# Patient Record
Sex: MALE | Race: ASIAN | Employment: OTHER | URBAN - METROPOLITAN AREA
[De-identification: names, ages, dates, MRNs, and addresses within clinical notes are randomized per-mention and may not be internally consistent; named-entity substitution may affect disease eponyms.]

---

## 2018-02-24 ENCOUNTER — APPOINTMENT (OUTPATIENT)
Dept: ULTRASOUND IMAGING | Facility: HOSPITAL | Age: 78
DRG: 853 | End: 2018-02-24
Attending: EMERGENCY MEDICINE
Payer: MEDICARE

## 2018-02-24 ENCOUNTER — HOSPITAL ENCOUNTER (INPATIENT)
Facility: HOSPITAL | Age: 78
LOS: 7 days | Discharge: NURSING, REHAB OR LONG-TERM CARE | DRG: 853 | End: 2018-03-04
Attending: EMERGENCY MEDICINE | Admitting: STUDENT IN AN ORGANIZED HEALTH CARE EDUCATION/TRAINING PROGRAM
Payer: MEDICARE

## 2018-02-24 ENCOUNTER — APPOINTMENT (OUTPATIENT)
Dept: CT IMAGING | Facility: HOSPITAL | Age: 78
DRG: 853 | End: 2018-02-24
Attending: EMERGENCY MEDICINE
Payer: MEDICARE

## 2018-02-24 ENCOUNTER — APPOINTMENT (OUTPATIENT)
Dept: GENERAL RADIOLOGY | Facility: HOSPITAL | Age: 78
DRG: 853 | End: 2018-02-24
Attending: EMERGENCY MEDICINE
Payer: MEDICARE

## 2018-02-24 DIAGNOSIS — K85.90 ACUTE PANCREATITIS WITHOUT INFECTION OR NECROSIS, UNSPECIFIED PANCREATITIS TYPE: Primary | ICD-10-CM

## 2018-02-24 DIAGNOSIS — N17.9 ACUTE RENAL INJURY (HCC): ICD-10-CM

## 2018-02-24 LAB
ALBUMIN SERPL-MCNC: 3 G/DL (ref 3.5–4.8)
ALP LIVER SERPL-CCNC: 312 U/L (ref 45–117)
ALT SERPL-CCNC: 293 U/L (ref 17–63)
AST SERPL-CCNC: 168 U/L (ref 15–41)
BASOPHILS # BLD AUTO: 0.01 X10(3) UL (ref 0–0.1)
BASOPHILS NFR BLD AUTO: 0.1 %
BILIRUB SERPL-MCNC: 7.3 MG/DL (ref 0.1–2)
BUN BLD-MCNC: 31 MG/DL (ref 8–20)
CALCIUM BLD-MCNC: 8.9 MG/DL (ref 8.3–10.3)
CHLORIDE: 106 MMOL/L (ref 101–111)
CO2: 25 MMOL/L (ref 22–32)
CREAT BLD-MCNC: 1.98 MG/DL (ref 0.7–1.3)
EOSINOPHIL # BLD AUTO: 0 X10(3) UL (ref 0–0.3)
EOSINOPHIL NFR BLD AUTO: 0 %
ERYTHROCYTE [DISTWIDTH] IN BLOOD BY AUTOMATED COUNT: 14.7 % (ref 11.5–16)
GLUCOSE BLD-MCNC: 147 MG/DL (ref 70–99)
HCT VFR BLD AUTO: 39.6 % (ref 37–53)
HGB BLD-MCNC: 13 G/DL (ref 13–17)
IMMATURE GRANULOCYTE COUNT: 0.03 X10(3) UL (ref 0–1)
IMMATURE GRANULOCYTE RATIO %: 0.4 %
LACTIC ACID: 1.8 MMOL/L (ref 0.5–2)
LIPASE: >7500 U/L (ref 73–393)
LYMPHOCYTES # BLD AUTO: 0.08 X10(3) UL (ref 0.9–4)
LYMPHOCYTES NFR BLD AUTO: 1.1 %
M PROTEIN MFR SERPL ELPH: 7.3 G/DL (ref 6.1–8.3)
MCH RBC QN AUTO: 30.2 PG (ref 27–33.2)
MCHC RBC AUTO-ENTMCNC: 32.8 G/DL (ref 31–37)
MCV RBC AUTO: 92.1 FL (ref 80–99)
MONOCYTES # BLD AUTO: 0.55 X10(3) UL (ref 0.1–1)
MONOCYTES NFR BLD AUTO: 7.2 %
NEUTROPHIL ABS PRELIM: 6.94 X10 (3) UL (ref 1.3–6.7)
NEUTROPHILS # BLD AUTO: 6.94 X10(3) UL (ref 1.3–6.7)
NEUTROPHILS NFR BLD AUTO: 91.2 %
PLATELET # BLD AUTO: 115 10(3)UL (ref 150–450)
POTASSIUM SERPL-SCNC: 3.4 MMOL/L (ref 3.6–5.1)
RBC # BLD AUTO: 4.3 X10(6)UL (ref 3.8–5.8)
RED CELL DISTRIBUTION WIDTH-SD: 49.1 FL (ref 35.1–46.3)
SODIUM SERPL-SCNC: 142 MMOL/L (ref 136–144)
WBC # BLD AUTO: 7.6 X10(3) UL (ref 4–13)

## 2018-02-24 PROCEDURE — 76700 US EXAM ABDOM COMPLETE: CPT | Performed by: EMERGENCY MEDICINE

## 2018-02-24 PROCEDURE — 71045 X-RAY EXAM CHEST 1 VIEW: CPT | Performed by: EMERGENCY MEDICINE

## 2018-02-24 PROCEDURE — 74176 CT ABD & PELVIS W/O CONTRAST: CPT | Performed by: EMERGENCY MEDICINE

## 2018-02-24 PROCEDURE — 99223 1ST HOSP IP/OBS HIGH 75: CPT | Performed by: STUDENT IN AN ORGANIZED HEALTH CARE EDUCATION/TRAINING PROGRAM

## 2018-02-24 RX ORDER — POTASSIUM CHLORIDE 20 MEQ/1
20 TABLET, EXTENDED RELEASE ORAL DAILY
Status: ON HOLD | COMMUNITY
End: 2020-01-01

## 2018-02-24 RX ORDER — BICALUTAMIDE 50 MG/1
50 TABLET, FILM COATED ORAL DAILY
Status: ON HOLD | COMMUNITY
End: 2020-01-01

## 2018-02-24 RX ORDER — L.ACID,PARA/B.BIFIDUM/S.THERM 8B CELL
1 CAPSULE ORAL DAILY
COMMUNITY

## 2018-02-24 RX ORDER — ONDANSETRON 2 MG/ML
4 INJECTION INTRAMUSCULAR; INTRAVENOUS ONCE
Status: DISCONTINUED | OUTPATIENT
Start: 2018-02-24 | End: 2018-02-24

## 2018-02-24 RX ORDER — WATER / MINERAL OIL / WHITE PETROLATUM 16 OZ
CREAM TOPICAL AS NEEDED
Status: ON HOLD | COMMUNITY
End: 2020-01-01

## 2018-02-24 RX ORDER — PROCHLORPERAZINE 25 MG
25 SUPPOSITORY, RECTAL RECTAL EVERY 12 HOURS PRN
COMMUNITY

## 2018-02-24 RX ORDER — ONDANSETRON 2 MG/ML
4 INJECTION INTRAMUSCULAR; INTRAVENOUS ONCE
Status: DISCONTINUED | OUTPATIENT
Start: 2018-02-24 | End: 2018-03-04

## 2018-02-25 ENCOUNTER — APPOINTMENT (OUTPATIENT)
Dept: GENERAL RADIOLOGY | Facility: HOSPITAL | Age: 78
DRG: 853 | End: 2018-02-25
Attending: STUDENT IN AN ORGANIZED HEALTH CARE EDUCATION/TRAINING PROGRAM
Payer: MEDICARE

## 2018-02-25 PROBLEM — K85.90 ACUTE PANCREATITIS WITHOUT INFECTION OR NECROSIS, UNSPECIFIED PANCREATITIS TYPE: Status: ACTIVE | Noted: 2018-02-25

## 2018-02-25 PROBLEM — N17.9 ACUTE RENAL INJURY (HCC): Status: ACTIVE | Noted: 2018-02-25

## 2018-02-25 LAB
ALBUMIN SERPL-MCNC: 2.4 G/DL (ref 3.5–4.8)
ALP LIVER SERPL-CCNC: 237 U/L (ref 45–117)
ALT SERPL-CCNC: 203 U/L (ref 17–63)
AST SERPL-CCNC: 98 U/L (ref 15–41)
ATRIAL RATE: 114 BPM
BASOPHILS # BLD AUTO: 0.01 X10(3) UL (ref 0–0.1)
BASOPHILS NFR BLD AUTO: 0.1 %
BILIRUB SERPL-MCNC: 6.3 MG/DL (ref 0.1–2)
BUN BLD-MCNC: 31 MG/DL (ref 8–20)
CALCIUM BLD-MCNC: 8.1 MG/DL (ref 8.3–10.3)
CHLORIDE: 112 MMOL/L (ref 101–111)
CO2: 23 MMOL/L (ref 22–32)
CREAT BLD-MCNC: 1.7 MG/DL (ref 0.7–1.3)
EOSINOPHIL # BLD AUTO: 0 X10(3) UL (ref 0–0.3)
EOSINOPHIL NFR BLD AUTO: 0 %
ERYTHROCYTE [DISTWIDTH] IN BLOOD BY AUTOMATED COUNT: 14.9 % (ref 11.5–16)
GLUCOSE BLD-MCNC: 121 MG/DL (ref 70–99)
HCT VFR BLD AUTO: 33.7 % (ref 37–53)
HGB BLD-MCNC: 11.1 G/DL (ref 13–17)
IMMATURE GRANULOCYTE COUNT: 0.02 X10(3) UL (ref 0–1)
IMMATURE GRANULOCYTE RATIO %: 0.3 %
LYMPHOCYTES # BLD AUTO: 0.26 X10(3) UL (ref 0.9–4)
LYMPHOCYTES NFR BLD AUTO: 3.8 %
M PROTEIN MFR SERPL ELPH: 5.8 G/DL (ref 6.1–8.3)
MCH RBC QN AUTO: 30.2 PG (ref 27–33.2)
MCHC RBC AUTO-ENTMCNC: 32.9 G/DL (ref 31–37)
MCV RBC AUTO: 91.8 FL (ref 80–99)
MONOCYTES # BLD AUTO: 0.8 X10(3) UL (ref 0.1–1)
MONOCYTES NFR BLD AUTO: 11.7 %
NEUTROPHIL ABS PRELIM: 5.76 X10 (3) UL (ref 1.3–6.7)
NEUTROPHILS # BLD AUTO: 5.76 X10(3) UL (ref 1.3–6.7)
NEUTROPHILS NFR BLD AUTO: 84.1 %
P AXIS: 31 DEGREES
P-R INTERVAL: 146 MS
PLATELET # BLD AUTO: 91 10(3)UL (ref 150–450)
POTASSIUM SERPL-SCNC: 3.3 MMOL/L (ref 3.6–5.1)
Q-T INTERVAL: 356 MS
QRS DURATION: 108 MS
QTC CALCULATION (BEZET): 490 MS
R AXIS: -48 DEGREES
RBC # BLD AUTO: 3.67 X10(6)UL (ref 3.8–5.8)
RED CELL DISTRIBUTION WIDTH-SD: 50.3 FL (ref 35.1–46.3)
SODIUM SERPL-SCNC: 145 MMOL/L (ref 136–144)
T AXIS: -2 DEGREES
VENTRICULAR RATE: 114 BPM
WBC # BLD AUTO: 6.9 X10(3) UL (ref 4–13)

## 2018-02-25 PROCEDURE — 71045 X-RAY EXAM CHEST 1 VIEW: CPT | Performed by: STUDENT IN AN ORGANIZED HEALTH CARE EDUCATION/TRAINING PROGRAM

## 2018-02-25 PROCEDURE — 99233 SBSQ HOSP IP/OBS HIGH 50: CPT | Performed by: HOSPITALIST

## 2018-02-25 RX ORDER — ONDANSETRON 2 MG/ML
4 INJECTION INTRAMUSCULAR; INTRAVENOUS EVERY 4 HOURS PRN
Status: DISCONTINUED | OUTPATIENT
Start: 2018-02-25 | End: 2018-02-25

## 2018-02-25 RX ORDER — DONEPEZIL HYDROCHLORIDE 10 MG/1
10 TABLET, FILM COATED ORAL NIGHTLY
Status: DISCONTINUED | OUTPATIENT
Start: 2018-02-25 | End: 2018-03-04

## 2018-02-25 RX ORDER — MORPHINE SULFATE 2 MG/ML
2 INJECTION, SOLUTION INTRAMUSCULAR; INTRAVENOUS EVERY 2 HOUR PRN
Status: DISCONTINUED | OUTPATIENT
Start: 2018-02-25 | End: 2018-02-27 | Stop reason: SDUPTHER

## 2018-02-25 RX ORDER — MORPHINE SULFATE 2 MG/ML
1 INJECTION, SOLUTION INTRAMUSCULAR; INTRAVENOUS EVERY 2 HOUR PRN
Status: DISCONTINUED | OUTPATIENT
Start: 2018-02-25 | End: 2018-02-27 | Stop reason: SDUPTHER

## 2018-02-25 RX ORDER — ONDANSETRON 2 MG/ML
4 INJECTION INTRAMUSCULAR; INTRAVENOUS EVERY 6 HOURS PRN
Status: DISCONTINUED | OUTPATIENT
Start: 2018-02-25 | End: 2018-03-04

## 2018-02-25 RX ORDER — HEPARIN SODIUM 5000 [USP'U]/ML
5000 INJECTION, SOLUTION INTRAVENOUS; SUBCUTANEOUS EVERY 12 HOURS SCHEDULED
Status: DISCONTINUED | OUTPATIENT
Start: 2018-02-25 | End: 2018-02-28

## 2018-02-25 RX ORDER — SODIUM CHLORIDE, SODIUM LACTATE, POTASSIUM CHLORIDE, CALCIUM CHLORIDE 600; 310; 30; 20 MG/100ML; MG/100ML; MG/100ML; MG/100ML
INJECTION, SOLUTION INTRAVENOUS CONTINUOUS
Status: DISCONTINUED | OUTPATIENT
Start: 2018-02-25 | End: 2018-02-27

## 2018-02-25 RX ORDER — SODIUM CHLORIDE 9 MG/ML
INJECTION, SOLUTION INTRAVENOUS CONTINUOUS
Status: ACTIVE | OUTPATIENT
Start: 2018-02-25 | End: 2018-02-25

## 2018-02-25 NOTE — PROGRESS NOTES
[facility] PSYCHIATRIC [place] HOSPITALIST  Progress Note     Cassandra Ferro Patient Status:  Inpatient    1/15/1940 MRN [id number]   [facility] 4NW-A Attending Esau Saeed MD   Hosp Day # 0 PCP [facility]     Chief Complaint: Nausea, vomiting    S: Patient nonverbal 4 mg Intravenous Once       ASSESSMENT / PLAN:     1. Acute pancreatitis, biliary in etiology given LFTs  1. Keep NPO, IVF  2. May need EGD/ERCP  3. GI following  2. Hypotension, resolved  3. LAURA, improving with IVF  4.  Possible aspiration PNA, on empiric

## 2018-02-25 NOTE — PLAN OF CARE
GASTROINTESTINAL - ADULT    • Minimal or absence of nausea and vomiting Progressing    • Maintains or returns to baseline bowel function Progressing        Pt. W/ no emesis. No abd pain or gaurding. No BM today. NPO.  Per GI may have sips of water/ clear li

## 2018-02-25 NOTE — PHYSICAL THERAPY NOTE
PHYSICAL THERAPY EVALUATION - INPATIENT     Room Number: 423/423-A  Evaluation Date: 2/25/2018  Type of Evaluation: Initial  Physician Order: PT Eval and Treat    Presenting Problem: nausea, vomiting, fatigue  Reason for Therapy: Mobility Dysfunction time and follows one step commands with repetition  · Initiation: cues to initiate tasks and hand over hand to initiate tasks  · Motor Planning: impaired  · Safety Judgement:  decreased awareness of need for assistance and decreased awareness of need for s definition    Skilled Therapy Provided: RN cleared pt for session but reported pt does not speak English and had been lethargic. PCT gave pt a bath and required total A to roll.  Upon arrival, pt received elevated supine in bed, opened eyes as soon as thera outcome measures completed include AM PAC with score 50.57%. Based on this evaluation, patient's clinical presentation is stable and overall the evaluation complexity is considered low.   These impairments and comorbidities manifest themselves as functiona

## 2018-02-25 NOTE — ED PROVIDER NOTES
Patient Seen in: [facility] Emergency Department    History   Patient presents with:  Nausea/Vomiting/Diarrhea (gastrointestinal)    Stated Complaint: vomiting    HPI    Patient is a 34-year-old nursing home resident, DNR status, presenting for eval reactive to light. Oropharynx is pink and moist.  NECK: Neck is supple and nontender. The trachea is midline. LUNGS: Lungs are clear to auscultation bilaterally, respirations are unlabored. HEART: Tachycardic and regular. There are no rubs or gallops. DRAW LAVENDER   RAINBOW DRAW LIGHT GREEN   RAINBOW DRAW GOLD   BLOOD CULTURE     EKG: The EKG revealed rate, intervals, and axis as noted on the EKG report. I have reviewed and agree with these readings.   Sinus tachycardia 114 bpm.    Us Abdomen Complete (c atelectasis. Lungs are otherwise clear. No pleural effusion or vascular congestion. Thoracic spondylosis. CONCLUSION:  Mild left basilar atelectasis. Otherwise grossly negative.      Dictated by: Jessica Savage MD on 2/24/2018 at 20:28     Approved

## 2018-02-25 NOTE — ED NOTES
Pt shivering again, diaper saturated, new diaper applied and warm blankets applied, pt demonstrated immediate relief in discomfort. Transport en route for pt. Family left bedside. Pt in no distress.

## 2018-02-25 NOTE — ED INITIAL ASSESSMENT (HPI)
Brought in from [address] [place] [address] for c/o vomiting x 2 followed by low saturation readings. Pt is DNR and hx of prostate CA.

## 2018-02-25 NOTE — H&P
[facility] HOSPITALIST  History and Physical     Roberto Light Patient Status:  Emergency    1/15/1940 MRN [id number]   Location [address] [facility] Attending Katharine Townsend MD   Hosp Day # 0 PCP [facility]     Chief Complaint: Nausea, vomiti EOM-LEONA SUE. Anicteric. Neck: No lymphadenopathy. No JVD. No carotid bruits. Respiratory: rhonchi  Cardiovascular: S1, S2. Regular rate and rhythm. No murmurs, rubs or gallops. Equal pulses. Chest and Back: No tenderness or deformity.   Abdomen: Soft,

## 2018-02-25 NOTE — ED NOTES
Pt was noted shivering, diaper saturated and pt had large BM. New diaper applied and heat lamps turned on. Vitals are stable. IVF rate changed, see MAR. Pt awaiting dispostion.

## 2018-02-26 ENCOUNTER — ANESTHESIA (OUTPATIENT)
Dept: ENDOSCOPY | Facility: HOSPITAL | Age: 78
DRG: 853 | End: 2018-02-26
Payer: MEDICARE

## 2018-02-26 ENCOUNTER — ANESTHESIA EVENT (OUTPATIENT)
Dept: ENDOSCOPY | Facility: HOSPITAL | Age: 78
DRG: 853 | End: 2018-02-26
Payer: MEDICARE

## 2018-02-26 ENCOUNTER — SURGERY (OUTPATIENT)
Age: 78
End: 2018-02-26

## 2018-02-26 ENCOUNTER — APPOINTMENT (OUTPATIENT)
Dept: GENERAL RADIOLOGY | Facility: HOSPITAL | Age: 78
DRG: 853 | End: 2018-02-26
Attending: INTERNAL MEDICINE
Payer: MEDICARE

## 2018-02-26 LAB
ALBUMIN SERPL-MCNC: 2.2 G/DL (ref 3.5–4.8)
ALP LIVER SERPL-CCNC: 174 U/L (ref 45–117)
ALT SERPL-CCNC: 138 U/L (ref 17–63)
AST SERPL-CCNC: 47 U/L (ref 15–41)
BILIRUB SERPL-MCNC: 2.7 MG/DL (ref 0.1–2)
BUN BLD-MCNC: 30 MG/DL (ref 8–20)
CALCIUM BLD-MCNC: 8.5 MG/DL (ref 8.3–10.3)
CHLORIDE: 115 MMOL/L (ref 101–111)
CO2: 26 MMOL/L (ref 22–32)
CREAT BLD-MCNC: 1.15 MG/DL (ref 0.7–1.3)
GLUCOSE BLD-MCNC: 98 MG/DL (ref 70–99)
M PROTEIN MFR SERPL ELPH: 5.6 G/DL (ref 6.1–8.3)
POTASSIUM SERPL-SCNC: 3.4 MMOL/L (ref 3.6–5.1)
POTASSIUM SERPL-SCNC: 3.4 MMOL/L (ref 3.6–5.1)
SODIUM SERPL-SCNC: 149 MMOL/L (ref 136–144)

## 2018-02-26 PROCEDURE — 99233 SBSQ HOSP IP/OBS HIGH 50: CPT | Performed by: HOSPITALIST

## 2018-02-26 PROCEDURE — 74328 X-RAY BILE DUCT ENDOSCOPY: CPT | Performed by: INTERNAL MEDICINE

## 2018-02-26 PROCEDURE — 0DJ08ZZ INSPECTION OF UPPER INTESTINAL TRACT, VIA NATURAL OR ARTIFICIAL OPENING ENDOSCOPIC: ICD-10-PCS | Performed by: INTERNAL MEDICINE

## 2018-02-26 PROCEDURE — BD47ZZZ ULTRASONOGRAPHY OF GASTROINTESTINAL TRACT: ICD-10-PCS | Performed by: INTERNAL MEDICINE

## 2018-02-26 PROCEDURE — 0FC98ZZ EXTIRPATION OF MATTER FROM COMMON BILE DUCT, VIA NATURAL OR ARTIFICIAL OPENING ENDOSCOPIC: ICD-10-PCS | Performed by: INTERNAL MEDICINE

## 2018-02-26 RX ORDER — NALOXONE HYDROCHLORIDE 0.4 MG/ML
80 INJECTION, SOLUTION INTRAMUSCULAR; INTRAVENOUS; SUBCUTANEOUS AS NEEDED
Status: CANCELLED | OUTPATIENT
Start: 2018-02-26 | End: 2018-02-26

## 2018-02-26 RX ORDER — SODIUM CHLORIDE, SODIUM LACTATE, POTASSIUM CHLORIDE, CALCIUM CHLORIDE 600; 310; 30; 20 MG/100ML; MG/100ML; MG/100ML; MG/100ML
INJECTION, SOLUTION INTRAVENOUS CONTINUOUS
Status: CANCELLED | OUTPATIENT
Start: 2018-02-26

## 2018-02-26 NOTE — OPERATIVE REPORT
Wilver Herrera Patient Status:  Inpatient    1/15/1940 MRN [id number]   [facility] ENDOSCOPY Attending Clark Bruce MD   Hosp Day # 1 PCP JOHN Akbar DIAGNOSIS/INDICATION: Gallstone pancreatitis, choledocholithiasis on cannulation. A stone was visualized in the CBD. The CBD measured 6 mm. Biliary sphincterotomy was performed using ERBE endocut electrocautery. There was no post sphincterotomy bleeding.  The wire was left in place and the sphincterotome was exchanged for a

## 2018-02-26 NOTE — H&P (VIEW-ONLY)
[address] [facility]  Report of GI Consultation    Ashleydebbie Beverlys Patient Status:  Inpatient    1/15/1940 MRN [id number]   [facility] 4NW-A Attending Harshad Lao MD   Hosp Day # 0 PCP Christian Jang     Date of Admission:  2018  Date of Consult: Concern    None on file    Social History Narrative    None on file            Current Medications:    Current Facility-Administered Medications:  lactated ringers infusion  Intravenous Continuous   Heparin Sodium (Porcine) 5000 UNIT/ML injection 5,000 Uni 02/25/2018   ALKPHO 237 (H) 02/25/2018   TP 5.8 (L) 02/25/2018   AST 98 (H) 02/25/2018    (H) 02/25/2018   BILT 6.3 (H) 02/25/2018   PTT 25.6 12/24/2013   INR 1.07 12/24/2013   TSH 1.330 12/25/2013   LIP >7,500 (H) 02/24/2018   MG 2.2 12/25/2013   T Dictated by: Carlos Manuel Castaneda MD on 2/25/2018 at 7:50     Approved by: Carlos Manuel Castaneda MD            Xr Chest Ap Portable  (cpt=71045)    Result Date: 2/24/2018  CONCLUSION:  Mild left basilar atelectasis. Otherwise grossly negative.      Dictated by: Homero Aguayo

## 2018-02-26 NOTE — INTERVAL H&P NOTE
Pre-op Diagnosis: Jaundice [R17]    The above referenced H&P was reviewed by Judy Rodriguez DO on 2/26/2018, the patient was examined and no significant changes have occurred in the patient's condition since the H&P was performed.   I discussed with the p

## 2018-02-26 NOTE — OPERATIVE REPORT
Dee Dee Carverst Patient Status:  Inpatient    1/15/1940 MRN [id number]   [facility] ENDOSCOPY Attending Coral Mckeon MD   Hosp Day # 1 PCP JOHN [facility]     PREOPERATIVE DIAGNOSIS/INDICATION: Acute gallstone pancreatitis  Lily Buenrostro

## 2018-02-26 NOTE — CONSULTS
[address] [facility]  Report of GI Consultation    Irene Vasques Patient Status:  Inpatient    1/15/1940 MRN [id number]   [facility] 4NW-A Attending Eliazar Davila MD   Hosp Day # 0 PCP Yves Silver     Date of Admission:  2018  Date of Consult: Concern    None on file    Social History Narrative    None on file            Current Medications:    Current Facility-Administered Medications:  lactated ringers infusion  Intravenous Continuous   Heparin Sodium (Porcine) 5000 UNIT/ML injection 5,000 Uni 02/25/2018   ALKPHO 237 (H) 02/25/2018   TP 5.8 (L) 02/25/2018   AST 98 (H) 02/25/2018    (H) 02/25/2018   BILT 6.3 (H) 02/25/2018   PTT 25.6 12/24/2013   INR 1.07 12/24/2013   TSH 1.330 12/25/2013   LIP >7,500 (H) 02/24/2018   MG 2.2 12/25/2013   T Dictated by: Rene Rudolph MD on 2/25/2018 at 7:50     Approved by: Rene Rudolph MD            Xr Chest Ap Portable  (cpt=71045)    Result Date: 2/24/2018  CONCLUSION:  Mild left basilar atelectasis. Otherwise grossly negative.      Dictated by: Eduardo Real

## 2018-02-26 NOTE — ANESTHESIA PREPROCEDURE EVALUATION
PRE-OP EVALUATION    Patient Name: Jadiel Ellison    Pre-op Diagnosis: Jaundice [R17]    Procedure(s):  ENDOSCOPIC ULTRASOUND (EUS)  ENDOSCOPIC RETROGRADE CHOLANGIOPANCREATOGRAPHY (ERCP)    Surgeon(s) and Role:     * Teddy Reinoso, DO - Primary    Pre-op vi mEq by mouth daily. Disp:  Rfl:        Allergies: Patient has no known allergies.       Anesthesia Evaluation  Past Surgical History:  No date: SURG EXPOS,PROSTATE,RADIOACTIV INSRT     Smoking status: Never Smoker    Smokeless tobacco: Never Used    Alcohol

## 2018-02-26 NOTE — CONSULTS
INFECTIOUS DISEASE CONSULT NOTE    Morrisnorris Bianchi Patient Status:  Inpatient    1/15/1940 MRN [id number]   [facility] 4NW-A Attending Dat Mcgee MD   Hosp Day # 1 PCP JOHN [facility]       Re mg, Intravenous, Q6H PRN  •  Donepezil HCl (ARICEPT) tab 10 mg, 10 mg, Oral, Nightly  •  morphINE sulfate (PF) 2 MG/ML injection 1 mg, 1 mg, Intravenous, Q2H PRN **OR** morphINE sulfate (PF) 2 MG/ML injection 2 mg, 2 mg, Intravenous, Q2H PRN  •  Piperacill Microbiology    Reviewed in EMR,     Radiology: Us Abdomen Complete (cpt=76700)    Result Date: 2/24/2018  PROCEDURE:  US ABDOMEN COMPLETE (CPT=76700)  COMPARISON:  None.   INDICATIONS:  pancreatitis  TECHNIQUE:  Real time gray-scale ultrasound was used abnormal density, or significant focal lesion. BILIARY:  No visible dilatation or calcification. The gallbladder is distended upper limits of normal almost 5 cm. No evidence of acute cholecystitis. No calcified stones. A 11 mm common bile duct.  PANCR cholecystitis. No calcified stones. There is mild dilatation of the common bile duct measuring 11 mm. Saccular infrarenal abdominal aortic aneurysm 3.9 x 3.8 cm. Moderate aorta iliac calcified plaque formation. Lumbar spondylosis.   Right hip arthrop MD on 2/24/2018 at 20:28     Approved by: Shari Kohli MD             ASSESSMENT:    1. GNR sepsis- assume due to cholangitis, better, no fevers and hypotension resolved    2.  Hypotension- assume partly due to sepsis and vol depletion from poor intake an

## 2018-02-26 NOTE — PLAN OF CARE
GASTROINTESTINAL - ADULT    • Minimal or absence of nausea and vomiting Progressing    • Maintains or returns to baseline bowel function Progressing        Impaired Functional Mobility    • Achieve highest/safest level of mobility/gait Progressing        M

## 2018-02-26 NOTE — ANESTHESIA POSTPROCEDURE EVALUATION
[address] Patient Status:  Inpatient   Age/Gender 77year old male MRN [id number]   Location [address] [facility]. Attending Mckenzie Melgar MD   Hosp Day # 1 PCP [facility]       Anesthesia Post-op Note    Procedure(s):    with sphinct

## 2018-02-27 LAB
POTASSIUM SERPL-SCNC: 3.5 MMOL/L (ref 3.6–5.1)
POTASSIUM SERPL-SCNC: 4 MMOL/L (ref 3.6–5.1)

## 2018-02-27 PROCEDURE — 99223 1ST HOSP IP/OBS HIGH 75: CPT | Performed by: SURGERY

## 2018-02-27 PROCEDURE — 99233 SBSQ HOSP IP/OBS HIGH 50: CPT | Performed by: HOSPITALIST

## 2018-02-27 RX ORDER — MORPHINE SULFATE 4 MG/ML
1 INJECTION, SOLUTION INTRAMUSCULAR; INTRAVENOUS EVERY 2 HOUR PRN
Status: DISCONTINUED | OUTPATIENT
Start: 2018-02-27 | End: 2018-03-04

## 2018-02-27 RX ORDER — CEFAZOLIN SODIUM/WATER 2 G/20 ML
2 SYRINGE (ML) INTRAVENOUS EVERY 8 HOURS
Status: DISCONTINUED | OUTPATIENT
Start: 2018-02-27 | End: 2018-03-04

## 2018-02-27 RX ORDER — MORPHINE SULFATE 4 MG/ML
2 INJECTION, SOLUTION INTRAMUSCULAR; INTRAVENOUS EVERY 2 HOUR PRN
Status: DISCONTINUED | OUTPATIENT
Start: 2018-02-27 | End: 2018-03-04

## 2018-02-27 RX ORDER — POTASSIUM CHLORIDE 20 MEQ/1
40 TABLET, EXTENDED RELEASE ORAL EVERY 4 HOURS
Status: COMPLETED | OUTPATIENT
Start: 2018-02-27 | End: 2018-02-27

## 2018-02-27 NOTE — PLAN OF CARE
Achieve highest/safest level of mobility/gait Not Progressing      Electrolytes maintained within normal limits Not Progressing      Minimal or absence of nausea and vomiting Progressing      Maintains or returns to baseline bowel function Progressing

## 2018-02-27 NOTE — PROGRESS NOTES
[facility]    Gastroenterology Follow-Up Note      Placido Bianchi Patient Status:  Inpatient    1/15/1940 MRN [id number]   [facility] 4NW-A Attending Josh Kohler MD   Hosp Day # 2 PCP [facility]     Chief Complaint/Reason for Follow Up:

## 2018-02-27 NOTE — OCCUPATIONAL THERAPY NOTE
OCCUPATIONAL THERAPY QUICK EVALUATION - INPATIENT    Room Number: 423/423-A  Evaluation Date: 2/27/2018     Type of Evaluation: Quick Eval  Presenting Problem: Acute renal injury; acute pancreatitis without infection; jaundice    Physician Order: Yunior Ruelas staff during bath. RANGE OF MOTION AND STRENGTH ASSESSMENT  Upper extremity ROM is within functional limits as observed w/ volitional movement    Upper extremity strength appears WFL as observed w/ volitional activities.     NEUROLOGICAL FINDINGS NH.     Based on chart review, assessment, lack of active participation and h/o dementia, patient is not a candidate for skilled OT intervention.   Patient will benefit from returning to LTC facility where a familiar environment, familiar caregivers and rou

## 2018-02-27 NOTE — PROGRESS NOTES
[facility]  TEL: [phone]  FAX: [phone]    Eliza Boyd Patient Status:  Inpatient    1/15/1940 MRN [id number]   [facility] 4NW-A Attending Chema Julio MD   Hosp Day # 2 [address] ASSESSMENT:     1. E coli sepsis- assume due to cholangitis, better, no fevers and hypotension resolved     2. Hypotension- assume partly due to sepsis and vol depletion from poor intake and vomiting     3.  Cholelithiasis with choledocholithiasis with ch

## 2018-02-27 NOTE — CONSULTS
[facility]  Report of Consultation    Arabella Lazaro Patient Status:  Inpatient    1/15/1940 MRN [id number]   [facility] 4NW-A Attending Lowell Rebollar MD   [address] Day # 2 PCP Jose San     Date of consultation:      2018    Requ reports that he does not drink alcohol or use drugs.     Allergies:  No Known Allergies    Medications:    Current Facility-Administered Medications:   •  Potassium Chloride ER (K-DUR M20) CR tab 40 mEq, 40 mEq, Oral, Q4H  •  lactated ringers infusion, , In 91.8   MCH  30.2   MCHC  32.9   RDW  14.9   NEPRELIM  5.76   WBC  6.9   PLT  91.0*     Recent Labs   Lab  02/24/18   1950  02/25/18   0604  02/26/18   0541  02/27/18   0631   GLU  147*  121*  98   --    BUN  31*  31*  30*   --    CREATSERUM  1.98*  1.70* jaundice will be discussed versus the risk of general anesthesia and surgery in light of the patient's age and comorbidities. Final recommendations will be pending discussion with the patient's daughter    Discussed:    The potential treatment options were

## 2018-02-27 NOTE — PHYSICAL THERAPY NOTE
PHYSICAL THERAPY TREATMENT NOTE - INPATIENT    Room Number: 423/423-A     Session: 1/5   Number of Visits to Meet Established Goals: 5    Presenting Problem: nausea, vomiting, fatigue     History related to current admission: Pt is 77year old male admitt the bed?: A Little   How much help from another person does the patient currently need. ..   -   Moving to and from a bed to a chair (including a wheelchair)?: A Little   -   Need to walk in hospital room?: A Little   -   Climbing 3-5 steps with a railing?: conservation;Patient education; Family education;Gait training;Strengthening;Transfer training;Balance training  Rehab Potential : Good  Frequency (Obs): 5x/week    CURRENT GOALS     Goal #1 Patient is able to demonstrate supine - sit EOB @ level: supervisi

## 2018-02-27 NOTE — PROGRESS NOTES
[facility] HOSPITALIST  Progress Note     Irene Vasques Patient Status:  Inpatient    1/15/1940 MRN [id number]   [facility] 4NW-A Attending Eliazar Davila MD   Hosp Day # 2 PCP [facility]     Chief Complaint: Nausea, vomiting    S: Patient w/out any • Potassium Chloride ER  40 mEq Oral Q4H   • Heparin Sodium (Porcine)  5,000 Units Subcutaneous 2 times per day   • Donepezil HCl  10 mg Oral Nightly   • piperacillin-tazobactam  3.375 g Intravenous Q8H   • ondansetron HCl  4 mg Intravenous Once       AS

## 2018-02-28 ENCOUNTER — ANESTHESIA EVENT (OUTPATIENT)
Dept: SURGERY | Facility: HOSPITAL | Age: 78
DRG: 853 | End: 2018-02-28
Payer: MEDICARE

## 2018-02-28 LAB
ALBUMIN SERPL-MCNC: 2 G/DL (ref 3.5–4.8)
ALP LIVER SERPL-CCNC: 220 U/L (ref 45–117)
ALT SERPL-CCNC: 58 U/L (ref 17–63)
AMYLASE: 111 U/L (ref 25–115)
AST SERPL-CCNC: 37 U/L (ref 15–41)
BILIRUB SERPL-MCNC: 2.1 MG/DL (ref 0.1–2)
BUN BLD-MCNC: 14 MG/DL (ref 8–20)
CALCIUM BLD-MCNC: 8.3 MG/DL (ref 8.3–10.3)
CHLORIDE: 107 MMOL/L (ref 101–111)
CO2: 21 MMOL/L (ref 22–32)
CREAT BLD-MCNC: 1.04 MG/DL (ref 0.7–1.3)
ERYTHROCYTE [DISTWIDTH] IN BLOOD BY AUTOMATED COUNT: 14.9 % (ref 11.5–16)
GLUCOSE BLD-MCNC: 93 MG/DL (ref 70–99)
HCT VFR BLD AUTO: 34.5 % (ref 37–53)
HGB BLD-MCNC: 11.2 G/DL (ref 13–17)
M PROTEIN MFR SERPL ELPH: 6.1 G/DL (ref 6.1–8.3)
MCH RBC QN AUTO: 30.2 PG (ref 27–33.2)
MCHC RBC AUTO-ENTMCNC: 32.5 G/DL (ref 31–37)
MCV RBC AUTO: 93 FL (ref 80–99)
PLATELET # BLD AUTO: 132 10(3)UL (ref 150–450)
POTASSIUM SERPL-SCNC: 3.9 MMOL/L (ref 3.6–5.1)
RBC # BLD AUTO: 3.71 X10(6)UL (ref 3.8–5.8)
RED CELL DISTRIBUTION WIDTH-SD: 51.3 FL (ref 35.1–46.3)
SODIUM SERPL-SCNC: 136 MMOL/L (ref 136–144)
WBC # BLD AUTO: 7.6 X10(3) UL (ref 4–13)

## 2018-02-28 PROCEDURE — 99233 SBSQ HOSP IP/OBS HIGH 50: CPT | Performed by: SURGERY

## 2018-02-28 NOTE — ANESTHESIA PREPROCEDURE EVALUATION
PRE-OP EVALUATION    Patient Name: Yuliya Orr    Pre-op Diagnosis: IN PATIENT    Procedure(s):  LAPAROSCOPIC CHOLECYSTECTOMY WITH CHOLANGIOGRAM    Surgeon(s) and Role:     Nichelle Ybarra MD - Primary    Pre-op vitals reviewed.   Temp: 98.6 °F (37 °C)  P Allergies: Patient has no known allergies. Anesthesia Evaluation    Patient summary reviewed. Anesthetic Complications  (-) history of anesthetic complications         GI/Hepatic/Renal    Negative GI/hepatic/renal ROS. bronchospasm, aspiration, heart attack, stroke, and death discussed.   All questions answered    Plan/risks discussed with: patient and child/children                Present on Admission:  **None**

## 2018-02-28 NOTE — PHYSICAL THERAPY NOTE
New PT order placed by overnight RN this AM.  Chart reviewed. Pt just seen by PT on 2/27 and 2/25 and OT on 2/27.   Per PT note on 2/27, \"PT at this time d/t inability to follow commands and participate with PT.\"  Pt has shown inconsistent ability to par

## 2018-02-28 NOTE — PLAN OF CARE
NEUROLOGICAL - ADULT    • Achieves stable or improved neurological status Not Progressing          GASTROINTESTINAL - ADULT    • Minimal or absence of nausea and vomiting Progressing        Impaired Functional Mobility    • Achieve highest/safest level of

## 2018-02-28 NOTE — PROGRESS NOTES
[facility]  Progress Note    Oneita Harms Patient Status:  Inpatient    1/15/1940 MRN [id number]   [facility] 4NW-A Attending Michael Bautista MD   Hosp Day # 3 PCP [facility]     Subjective:  No new complaints. Denies abdominal pain.  Awake an recently had an ERCP procedure as well as E coli bacteremia, however she is interested in pursuing laparoscopic cholecystectomy during this admission if possible.  She and her family have discussed that they do want to pursue lap sirisha for their father even

## 2018-03-01 ENCOUNTER — APPOINTMENT (OUTPATIENT)
Dept: GENERAL RADIOLOGY | Facility: HOSPITAL | Age: 78
DRG: 853 | End: 2018-03-01
Attending: SURGERY
Payer: MEDICARE

## 2018-03-01 ENCOUNTER — ANESTHESIA (OUTPATIENT)
Dept: SURGERY | Facility: HOSPITAL | Age: 78
DRG: 853 | End: 2018-03-01
Payer: MEDICARE

## 2018-03-01 ENCOUNTER — SURGERY (OUTPATIENT)
Age: 78
End: 2018-03-01

## 2018-03-01 LAB
INR BLD: 1.1 (ref 0.89–1.11)
PLATELET # BLD AUTO: 132 10(3)UL (ref 150–450)
PSA SERPL DL<=0.01 NG/ML-MCNC: 14.2 SECONDS (ref 12–14.3)

## 2018-03-01 PROCEDURE — 74300 X-RAY BILE DUCTS/PANCREAS: CPT | Performed by: SURGERY

## 2018-03-01 PROCEDURE — BF131ZZ FLUOROSCOPY OF GALLBLADDER AND BILE DUCTS USING LOW OSMOLAR CONTRAST: ICD-10-PCS | Performed by: SURGERY

## 2018-03-01 PROCEDURE — 0FT44ZZ RESECTION OF GALLBLADDER, PERCUTANEOUS ENDOSCOPIC APPROACH: ICD-10-PCS | Performed by: SURGERY

## 2018-03-01 RX ORDER — MEPERIDINE HYDROCHLORIDE 25 MG/ML
12.5 INJECTION INTRAMUSCULAR; INTRAVENOUS; SUBCUTANEOUS AS NEEDED
Status: DISCONTINUED | OUTPATIENT
Start: 2018-03-01 | End: 2018-03-01 | Stop reason: HOSPADM

## 2018-03-01 RX ORDER — NALOXONE HYDROCHLORIDE 0.4 MG/ML
80 INJECTION, SOLUTION INTRAMUSCULAR; INTRAVENOUS; SUBCUTANEOUS AS NEEDED
Status: DISCONTINUED | OUTPATIENT
Start: 2018-03-01 | End: 2018-03-01 | Stop reason: HOSPADM

## 2018-03-01 RX ORDER — HYDROCODONE BITARTRATE AND ACETAMINOPHEN 5; 325 MG/1; MG/1
2 TABLET ORAL AS NEEDED
Status: DISCONTINUED | OUTPATIENT
Start: 2018-03-01 | End: 2018-03-01 | Stop reason: HOSPADM

## 2018-03-01 RX ORDER — ONDANSETRON 2 MG/ML
4 INJECTION INTRAMUSCULAR; INTRAVENOUS AS NEEDED
Status: DISCONTINUED | OUTPATIENT
Start: 2018-03-01 | End: 2018-03-01 | Stop reason: HOSPADM

## 2018-03-01 RX ORDER — HYDROCHLOROTHIAZIDE 12.5 MG/1
12.5 CAPSULE, GELATIN COATED ORAL EVERY OTHER DAY
COMMUNITY
End: 2018-03-04

## 2018-03-01 RX ORDER — BUPIVACAINE HYDROCHLORIDE AND EPINEPHRINE 5; 5 MG/ML; UG/ML
INJECTION, SOLUTION EPIDURAL; INTRACAUDAL; PERINEURAL AS NEEDED
Status: DISCONTINUED | OUTPATIENT
Start: 2018-03-01 | End: 2018-03-01 | Stop reason: HOSPADM

## 2018-03-01 RX ORDER — BICALUTAMIDE 50 MG/1
50 TABLET, FILM COATED ORAL DAILY
Status: DISCONTINUED | OUTPATIENT
Start: 2018-03-01 | End: 2018-03-04

## 2018-03-01 RX ORDER — CEFOXITIN 2 G/1
INJECTION, POWDER, FOR SOLUTION INTRAVENOUS
Status: DISCONTINUED | OUTPATIENT
Start: 2018-03-01 | End: 2018-03-01 | Stop reason: HOSPADM

## 2018-03-01 RX ORDER — LABETALOL HYDROCHLORIDE 5 MG/ML
5 INJECTION, SOLUTION INTRAVENOUS EVERY 5 MIN PRN
Status: DISCONTINUED | OUTPATIENT
Start: 2018-03-01 | End: 2018-03-01 | Stop reason: HOSPADM

## 2018-03-01 RX ORDER — HYDROCODONE BITARTRATE AND ACETAMINOPHEN 5; 325 MG/1; MG/1
1 TABLET ORAL AS NEEDED
Status: DISCONTINUED | OUTPATIENT
Start: 2018-03-01 | End: 2018-03-01 | Stop reason: HOSPADM

## 2018-03-01 RX ORDER — MIDAZOLAM HYDROCHLORIDE 1 MG/ML
1 INJECTION INTRAMUSCULAR; INTRAVENOUS EVERY 5 MIN PRN
Status: DISCONTINUED | OUTPATIENT
Start: 2018-03-01 | End: 2018-03-01 | Stop reason: HOSPADM

## 2018-03-01 RX ORDER — DIPHENHYDRAMINE HYDROCHLORIDE 50 MG/ML
12.5 INJECTION INTRAMUSCULAR; INTRAVENOUS AS NEEDED
Status: DISCONTINUED | OUTPATIENT
Start: 2018-03-01 | End: 2018-03-01 | Stop reason: HOSPADM

## 2018-03-01 RX ORDER — SODIUM CHLORIDE, SODIUM LACTATE, POTASSIUM CHLORIDE, CALCIUM CHLORIDE 600; 310; 30; 20 MG/100ML; MG/100ML; MG/100ML; MG/100ML
INJECTION, SOLUTION INTRAVENOUS CONTINUOUS
Status: DISCONTINUED | OUTPATIENT
Start: 2018-03-01 | End: 2018-03-01 | Stop reason: HOSPADM

## 2018-03-01 NOTE — OPERATIVE REPORT
[facility]   Operative Note    Puma Jimenez Location: OR   [facility] [id number] MRN [id number]   Admission Date 2/24/2018 Operation Date 3/1/2018   Attending Physician Lorna Paredes MD Operating Physician Pedro Luis Drake MD     Date of procedure:   3-1-2018 potential risks and benefits were discussed in detail with the patient's daughter and Jacklyn Singh.   She does  Not have any questions at this time and wishes to proceed with surgery today    Note:  A surgical assistant was essential to the performance and c port.   The cholangiocatheter was introduced into the cystic duct. An intraoperative cholangiogram was then performed. There was no evidence of a filling defect in the cystic or the common bile duct.  The common bile duct tapers down smoothly to the duoden in a figure of 8 fascial stitch. All skin incisions were closed with 4-0 Vicryl in a subcuticular manner. All sponge, instrument and needle counts were correct at the conclusion of the procedure. The patient did tolerate the procedure well.   The patient w

## 2018-03-01 NOTE — PROGRESS NOTES
[facility]  Progress Note    Roberto Light Patient Status:  Inpatient    1/15/1940 MRN [id number]   [facility] 4NW-A Attending Kaylee Bonds MD   Hosp Day # 4 PCP [facility]         SUBJECTIVE:  Subjective:  Roberto Lopez is a(n) 77year old 10 mg Oral Nightly   • ondansetron HCl  4 mg Intravenous Once       morphINE sulfate (PF) **OR** morphINE sulfate (PF), ondansetron HCl       Assessment/Plan:     Principal Problem:    Acute pancreatitis without infection or necrosis, unspecified pancreat

## 2018-03-01 NOTE — PROGRESS NOTES
Confused, language barrier. No signs of distress. No complaints of pain. Vitals stable. Brief on for incontinence. NPO for lap sirisha. IV abx continued. Fall precautions in place. Will monitor.

## 2018-03-01 NOTE — ANESTHESIA POSTPROCEDURE EVALUATION
[address] Patient Status:  Inpatient   Age/Gender 77year old male MRN [id number]   Location [address] [facility] Attending Lorna Paredes MD   Hosp Day # 4 PCP JOHN [facility]       Anesthesia Post-op Note    Procedure(s)

## 2018-03-01 NOTE — PROGRESS NOTES
[facility]  Progress Note    Susanne Tamez Patient Status:  Inpatient    1/15/1940 MRN [id number]   [facility] 4NW-A Attending Damon Lima MD   Hosp Day # 4 PCP [facility]     Subjective:  Patient resting comfortably, complains of some abd by  with respiratory symptoms      Choledocholithiasis s/p EUS/ERCP with stone extraction and sphincterotomy  E coli bacteremia     Plan:  1. The patient has been NPO since midnight. 2. OR today for laparoscopic cholecystectomy with Dr. Carlos Shah.   3.

## 2018-03-01 NOTE — PROGRESS NOTES
[facility]  TEL: [phone]  FAX: [phone]    Cassandra Ferro Patient Status:  Inpatient    1/15/1940 MRN [id number]   [facility] 4NW-A Attending Esau Saeed MD   Hosp Day # 4 [address] for cholecystectomy today     3. Biliary pancreatitis due to above     4. Possible aspiration pna- not seen in cxr, may have aspirated w/o resulting in pna. resp status has been stable.  Cough seems much better     PLAN:     -cont ancef for now but should b

## 2018-03-01 NOTE — BRIEF OP NOTE
Pre-Operative Diagnosis: gallstones, resolved pancreatitis     Post-Operative Diagnosis: same, negative cholangiogram - severe cholecystitis chronic and acute     Procedure Performed:   Procedure(s):  LAPAROSCOPIC CHOLECYSTECTOMY WITH CHOLANGIOGRAM, dominguez

## 2018-03-01 NOTE — PROGRESS NOTES
[facility]  Progress Note    Hobart Mortimer Patient Status:  Inpatient    1/15/1940 MRN [id number]   [facility] [place] 4NW-A Attending Didier Gonzalez MD   Hosp Day # 4 PCP [facility]         SUBJECTIVE:  Subjective:  Hobart Mortimer is a(n) 77year old ceFAZolin  2 g Intravenous Q8H   • Donepezil HCl  10 mg Oral Nightly   • ondansetron HCl  4 mg Intravenous Once       morphINE sulfate (PF) **OR** morphINE sulfate (PF), ondansetron HCl       Assessment/Plan:     Principal Problem:    Acute pancreatitis wi

## 2018-03-02 LAB
ALBUMIN SERPL-MCNC: 2.2 G/DL (ref 3.5–4.8)
ALP LIVER SERPL-CCNC: 205 U/L (ref 45–117)
ALT SERPL-CCNC: 25 U/L (ref 17–63)
AST SERPL-CCNC: 49 U/L (ref 15–41)
BASOPHILS # BLD AUTO: 0.05 X10(3) UL (ref 0–0.1)
BASOPHILS NFR BLD AUTO: 0.5 %
BILIRUB SERPL-MCNC: 1.8 MG/DL (ref 0.1–2)
BUN BLD-MCNC: 16 MG/DL (ref 8–20)
CALCIUM BLD-MCNC: 8.1 MG/DL (ref 8.3–10.3)
CHLORIDE: 109 MMOL/L (ref 101–111)
CO2: 24 MMOL/L (ref 22–32)
CREAT BLD-MCNC: 1.19 MG/DL (ref 0.7–1.3)
EOSINOPHIL # BLD AUTO: 0.05 X10(3) UL (ref 0–0.3)
EOSINOPHIL NFR BLD AUTO: 0.5 %
ERYTHROCYTE [DISTWIDTH] IN BLOOD BY AUTOMATED COUNT: 14.9 % (ref 11.5–16)
GLUCOSE BLD-MCNC: 101 MG/DL (ref 70–99)
HCT VFR BLD AUTO: 36.9 % (ref 37–53)
HGB BLD-MCNC: 12.2 G/DL (ref 13–17)
IMMATURE GRANULOCYTE COUNT: 0.25 X10(3) UL (ref 0–1)
IMMATURE GRANULOCYTE RATIO %: 2.4 %
LYMPHOCYTES # BLD AUTO: 0.52 X10(3) UL (ref 0.9–4)
LYMPHOCYTES NFR BLD AUTO: 4.9 %
M PROTEIN MFR SERPL ELPH: 5.5 G/DL (ref 6.1–8.3)
MCH RBC QN AUTO: 30.7 PG (ref 27–33.2)
MCHC RBC AUTO-ENTMCNC: 33.1 G/DL (ref 31–37)
MCV RBC AUTO: 92.9 FL (ref 80–99)
MONOCYTES # BLD AUTO: 0.7 X10(3) UL (ref 0.1–1)
MONOCYTES NFR BLD AUTO: 6.6 %
NEUTROPHIL ABS PRELIM: 8.98 X10 (3) UL (ref 1.3–6.7)
NEUTROPHILS # BLD AUTO: 8.98 X10(3) UL (ref 1.3–6.7)
NEUTROPHILS NFR BLD AUTO: 85.1 %
PLATELET # BLD AUTO: 171 10(3)UL (ref 150–450)
POTASSIUM SERPL-SCNC: 4.1 MMOL/L (ref 3.6–5.1)
RBC # BLD AUTO: 3.97 X10(6)UL (ref 3.8–5.8)
RED CELL DISTRIBUTION WIDTH-SD: 49.5 FL (ref 35.1–46.3)
SODIUM SERPL-SCNC: 141 MMOL/L (ref 136–144)
WBC # BLD AUTO: 10.6 X10(3) UL (ref 4–13)

## 2018-03-02 RX ORDER — ACETAMINOPHEN 325 MG/1
325 TABLET ORAL EVERY 6 HOURS PRN
Status: DISCONTINUED | OUTPATIENT
Start: 2018-03-02 | End: 2018-03-04

## 2018-03-02 RX ORDER — CEPHALEXIN 500 MG/1
500 CAPSULE ORAL 4 TIMES DAILY
Qty: 40 CAPSULE | Refills: 0 | Status: SHIPPED | OUTPATIENT
Start: 2018-03-02 | End: 2018-03-12

## 2018-03-02 NOTE — PROGRESS NOTES
Patient alert and oriented. With drain bulb s/p lap sirisha, draining with bloody output in moderate amount. Steri-strips dry and intact. No complaints of pain. Not in respiratory distress. Kemp catheter patent and intact with good urine output.  Vital signs

## 2018-03-02 NOTE — PROGRESS NOTES
[facility]  TEL: [phone]  FAX: [phone]    Susanne Tamez Patient Status:  Inpatient    1/15/1940 MRN [id number]   [facility] 4NW-A Attending Jeff Membreno MD   Hosp Day # 5 [address] hypotension resolved     2. Cholelithiasis with choledocholithiasis with cholangitis s/p ERCP with stone and sludge removal on 2/26, s/p cholecystectomy on 3/1     3. Biliary pancreatitis due to above     4.  Possible aspiration pna- not seen in cxr, may ha

## 2018-03-02 NOTE — PROGRESS NOTES
[facility]  Progress Note    Gutierrez Renner Patient Status:  Inpatient    1/15/1940 MRN [id number]   [facility] 4NW-A Attending Sangita Hart MD   Hosp Day # 5 PCP [facility]         SUBJECTIVE:  Subjective:  Gutierrez Renner is a(n) 77year old the last 72 hours.             Meds:     • bicalutamide  50 mg Oral Daily   • ceFAZolin  2 g Intravenous Q8H   • Donepezil HCl  10 mg Oral Nightly   • ondansetron HCl  4 mg Intravenous Once       morphINE sulfate (PF) **OR** morphINE sulfate (PF), ondansetr

## 2018-03-02 NOTE — PLAN OF CARE
GASTROINTESTINAL - ADULT    • Minimal or absence of nausea and vomiting Progressing        Impaired Functional Mobility    • Achieve highest/safest level of mobility/gait Progressing        NEUROLOGICAL - ADULT    • Achieves stable or improved neurological

## 2018-03-02 NOTE — PROGRESS NOTES
[facility]  Progress Note    Oneita Harms Patient Status:  Inpatient    1/15/1940 MRN [id number]   [facility] 4NW-A Attending Michael Bautista MD   Hosp Day # 5 PCP [facility]     Subjective:  Patient sitting up in chair, comfortable.  Just had unspecified pancreatitis type     Acute renal injury ([facility] [address]     Aspiration pneumonia (HCC)     Aspiration by  with respiratory symptoms      Choledocholithiasis s/p EUS/ERCP with stone extraction and sphincterotomy s/p laparoscopic cholecystectomy

## 2018-03-02 NOTE — PLAN OF CARE
Maintains or returns to baseline bowel function Progressing      Hemodynamic stability and optimal renal function maintained Progressing      Achieves stable or improved neurological status Progressing        Pt drowsy most of shift, speaking with family i

## 2018-03-03 ENCOUNTER — APPOINTMENT (OUTPATIENT)
Dept: GENERAL RADIOLOGY | Facility: HOSPITAL | Age: 78
DRG: 853 | End: 2018-03-03
Attending: INTERNAL MEDICINE
Payer: MEDICARE

## 2018-03-03 LAB
ALBUMIN SERPL-MCNC: 2 G/DL (ref 3.5–4.8)
ALP LIVER SERPL-CCNC: 156 U/L (ref 45–117)
ALT SERPL-CCNC: 11 U/L (ref 17–63)
AST SERPL-CCNC: 28 U/L (ref 15–41)
BASOPHILS # BLD AUTO: 0.03 X10(3) UL (ref 0–0.1)
BASOPHILS # BLD AUTO: 0.05 X10(3) UL (ref 0–0.1)
BASOPHILS NFR BLD AUTO: 0.3 %
BASOPHILS NFR BLD AUTO: 0.5 %
BILIRUB SERPL-MCNC: 1.4 MG/DL (ref 0.1–2)
BUN BLD-MCNC: 11 MG/DL (ref 8–20)
CALCIUM BLD-MCNC: 8.1 MG/DL (ref 8.3–10.3)
CHLORIDE: 105 MMOL/L (ref 101–111)
CO2: 27 MMOL/L (ref 22–32)
CREAT BLD-MCNC: 0.93 MG/DL (ref 0.7–1.3)
EOSINOPHIL # BLD AUTO: 0.11 X10(3) UL (ref 0–0.3)
EOSINOPHIL # BLD AUTO: 0.11 X10(3) UL (ref 0–0.3)
EOSINOPHIL NFR BLD AUTO: 1.1 %
EOSINOPHIL NFR BLD AUTO: 1.1 %
ERYTHROCYTE [DISTWIDTH] IN BLOOD BY AUTOMATED COUNT: 15.1 % (ref 11.5–16)
ERYTHROCYTE [DISTWIDTH] IN BLOOD BY AUTOMATED COUNT: 15.3 % (ref 11.5–16)
GLUCOSE BLD-MCNC: 115 MG/DL (ref 70–99)
HCT VFR BLD AUTO: 33.4 % (ref 37–53)
HCT VFR BLD AUTO: 34 % (ref 37–53)
HGB BLD-MCNC: 11 G/DL (ref 13–17)
HGB BLD-MCNC: 11.3 G/DL (ref 13–17)
IMMATURE GRANULOCYTE COUNT: 0.33 X10(3) UL (ref 0–1)
IMMATURE GRANULOCYTE COUNT: 0.44 X10(3) UL (ref 0–1)
IMMATURE GRANULOCYTE RATIO %: 3.4 %
IMMATURE GRANULOCYTE RATIO %: 4.3 %
LYMPHOCYTES # BLD AUTO: 0.45 X10(3) UL (ref 0.9–4)
LYMPHOCYTES # BLD AUTO: 0.51 X10(3) UL (ref 0.9–4)
LYMPHOCYTES NFR BLD AUTO: 4.6 %
LYMPHOCYTES NFR BLD AUTO: 5 %
M PROTEIN MFR SERPL ELPH: 5.7 G/DL (ref 6.1–8.3)
MCH RBC QN AUTO: 30.6 PG (ref 27–33.2)
MCH RBC QN AUTO: 31 PG (ref 27–33.2)
MCHC RBC AUTO-ENTMCNC: 32.9 G/DL (ref 31–37)
MCHC RBC AUTO-ENTMCNC: 33.2 G/DL (ref 31–37)
MCV RBC AUTO: 93 FL (ref 80–99)
MCV RBC AUTO: 93.2 FL (ref 80–99)
MONOCYTES # BLD AUTO: 0.7 X10(3) UL (ref 0.1–1)
MONOCYTES # BLD AUTO: 0.79 X10(3) UL (ref 0.1–1)
MONOCYTES NFR BLD AUTO: 7.2 %
MONOCYTES NFR BLD AUTO: 7.7 %
NEUTROPHIL ABS PRELIM: 8.14 X10 (3) UL (ref 1.3–6.7)
NEUTROPHIL ABS PRELIM: 8.37 X10 (3) UL (ref 1.3–6.7)
NEUTROPHILS # BLD AUTO: 8.14 X10(3) UL (ref 1.3–6.7)
NEUTROPHILS # BLD AUTO: 8.37 X10(3) UL (ref 1.3–6.7)
NEUTROPHILS NFR BLD AUTO: 81.6 %
NEUTROPHILS NFR BLD AUTO: 83.2 %
PLATELET # BLD AUTO: 184 10(3)UL (ref 150–450)
PLATELET # BLD AUTO: 192 10(3)UL (ref 150–450)
POTASSIUM SERPL-SCNC: 3.4 MMOL/L (ref 3.6–5.1)
RBC # BLD AUTO: 3.59 X10(6)UL (ref 3.8–5.8)
RBC # BLD AUTO: 3.65 X10(6)UL (ref 3.8–5.8)
RED CELL DISTRIBUTION WIDTH-SD: 51.1 FL (ref 35.1–46.3)
RED CELL DISTRIBUTION WIDTH-SD: 51.1 FL (ref 35.1–46.3)
SODIUM SERPL-SCNC: 138 MMOL/L (ref 136–144)
WBC # BLD AUTO: 10.3 X10(3) UL (ref 4–13)
WBC # BLD AUTO: 9.8 X10(3) UL (ref 4–13)

## 2018-03-03 PROCEDURE — 74018 RADEX ABDOMEN 1 VIEW: CPT | Performed by: INTERNAL MEDICINE

## 2018-03-03 NOTE — PROGRESS NOTES
[facility]  Progress Note    Leann Gifford Patient Status:  Inpatient    1/15/1940 MRN [id number]   [facility] 4NW-A Attending Maggi Soliz MD   Hosp Day # 6 PCP [facility]         SUBJECTIVE:  Subjective:  Leann Gifford is a(n) 77year old 2. 0*  2.2*  2.0*       No results for input(s): PGLU in the last 72 hours. No results for input(s): URINE, CULTI, BLDSMR in the last 72 hours.             Meds:     • potassium chloride 40mEq IVPB (peripheral line)  40 mEq Intravenous Once   • bicalutami

## 2018-03-03 NOTE — PLAN OF CARE
RESPIRATORY - ADULT    • Achieves optimal ventilation and oxygenation Adequate for Discharge          RESPIRATORY - ADULT    • Achieves optimal ventilation and oxygenation Adequate for Discharge          Impaired Functional Mobility    • Achieve highest/sa

## 2018-03-03 NOTE — PROGRESS NOTES
[facility]  Progress Note    Lorkyree Self Patient Status:  Inpatient    1/15/1940 MRN [id number]   [facility] 4NW-A Attending Magdi Rankin MD   Hosp Day # 6 PCP [facility]     Subjective:  Patient is noncommunicative but sitting in bed would like to minimize narcotics as patient has history of dementia  Also discussed with hospitalist-he will address possible use of Tylenol and/or NSAIDs  Liver enzymes trending towards improvement    Plan:   Continue monitor LFTs-ambulate with physical t

## 2018-03-04 VITALS
WEIGHT: 133.19 LBS | TEMPERATURE: 98 F | OXYGEN SATURATION: 96 % | HEART RATE: 80 BPM | RESPIRATION RATE: 18 BRPM | HEIGHT: 65 IN | BODY MASS INDEX: 22.19 KG/M2 | DIASTOLIC BLOOD PRESSURE: 76 MMHG | SYSTOLIC BLOOD PRESSURE: 132 MMHG

## 2018-03-04 LAB
BASOPHILS # BLD AUTO: 0.05 X10(3) UL (ref 0–0.1)
BASOPHILS NFR BLD AUTO: 0.5 %
EOSINOPHIL # BLD AUTO: 0.1 X10(3) UL (ref 0–0.3)
EOSINOPHIL NFR BLD AUTO: 1 %
ERYTHROCYTE [DISTWIDTH] IN BLOOD BY AUTOMATED COUNT: 15 % (ref 11.5–16)
HCT VFR BLD AUTO: 32.6 % (ref 37–53)
HGB BLD-MCNC: 11 G/DL (ref 13–17)
IMMATURE GRANULOCYTE COUNT: 0.46 X10(3) UL (ref 0–1)
IMMATURE GRANULOCYTE RATIO %: 4.6 %
LYMPHOCYTES # BLD AUTO: 0.46 X10(3) UL (ref 0.9–4)
LYMPHOCYTES NFR BLD AUTO: 4.6 %
MCH RBC QN AUTO: 31.1 PG (ref 27–33.2)
MCHC RBC AUTO-ENTMCNC: 33.7 G/DL (ref 31–37)
MCV RBC AUTO: 92.1 FL (ref 80–99)
MONOCYTES # BLD AUTO: 0.82 X10(3) UL (ref 0.1–1)
MONOCYTES NFR BLD AUTO: 8.2 %
NEUTROPHIL ABS PRELIM: 8.11 X10 (3) UL (ref 1.3–6.7)
NEUTROPHILS # BLD AUTO: 8.11 X10(3) UL (ref 1.3–6.7)
NEUTROPHILS NFR BLD AUTO: 81.1 %
PLATELET # BLD AUTO: 221 10(3)UL (ref 150–450)
POTASSIUM SERPL-SCNC: 3.6 MMOL/L (ref 3.6–5.1)
RBC # BLD AUTO: 3.54 X10(6)UL (ref 3.8–5.8)
RED CELL DISTRIBUTION WIDTH-SD: 49.6 FL (ref 35.1–46.3)
WBC # BLD AUTO: 10 X10(3) UL (ref 4–13)

## 2018-03-04 RX ORDER — CEPHALEXIN 500 MG/1
500 CAPSULE ORAL 3 TIMES DAILY
Qty: 18 CAPSULE | Refills: 0 | Status: SHIPPED | OUTPATIENT
Start: 2018-03-04 | End: 2018-03-10

## 2018-03-04 NOTE — DISCHARGE SUMMARY
[facility]  Discharge Summary    Wilver Herrera Patient Status:  Inpatient    1/15/1940 MRN [id number]   [facility] 4NW-A Attending Livia Mcdonald MD   [facility] Day # 7 PCP [facility]     Date of Admission: 2018    Date of Discharge: 3/4/1 Good    Discharge Medications: Current Discharge Medication List    START taking these medications    !! cephALEXin 500 MG Oral Cap  Take 1 capsule (500 mg total) by mouth 3 (three) times daily.   Qty: 18 capsule Refills: 0    !! cephALEXin 500 MG Oral Cap

## 2018-03-04 NOTE — PROGRESS NOTES
[facility]  TEL: [phone]  FAX: [phone]    Caroline Driver Patient Status:  Inpatient    1/15/1940 MRN [id number]   [facility] 4NW-A Attending Yany Landa MD   Hosp Day # 7 PCP [address] to cholangitis, better, no fevers and hypotension resolved     2. Cholelithiasis with choledocholithiasis with cholangitis s/p ERCP with stone and sludge removal on 2/26, s/p cholecystectomy on 3/1     3. Biliary pancreatitis due to above     4.  Possible a

## 2018-03-04 NOTE — PLAN OF CARE
Pt [facility] speaking. Daughter at bedside through the evening and helping pt eat diner. Pt standing at bedside, stable. Pt incontinent, changed and repositioned through the night. Pills crushed with apple sauce. VSS and afebrile.   Will continue to [facility]

## 2018-03-04 NOTE — PROGRESS NOTES
[facility]  Progress Note    Sarah Carrion Patient Status:  Inpatient    1/15/1940 MRN [id number]   [facility] 4NW-A Attending Greta Holcomb MD   Hosp Day # 7 PCP [facility]     Subjective:  No new complaints, tolerating diet, no nausea o daughter  3. Prescriptions on chart  4. Follow up in our office in 1 week for drain removal   5.  Drain education for DC       Time spent on counseling/coordination of care:  15 Minutes  Total time spent with patient:  [address] [facility]  3/4/201

## 2018-03-04 NOTE — PLAN OF CARE
Patient discharge was cancelled from Dr Khadijah Luna, for didn't tolerate low residue diet, patient sitting up in chair while rounding with night nurse.

## 2018-03-04 NOTE — PROGRESS NOTES
[facility]    Progress Note    Marilou Marks Patient Status:  Inpatient    1/15/1940 MRN [id number]   [facility] 4NW-A Attending Ines Rodas MD   Hosp Day # 7 PCP [facility]       SUBJECTIVE:  Getting up from chair to go for a walk wi [facility]) injection 4 mg 4 mg Intravenous Once         Assessment  Patient Active Problem List:     Pre-syncope     Essential hypertension     Hyperlipidemia     LAURA (acute kidney injury) ([facility] [address]     Hypokalemia     Bradycardia     Acute pancreatitis without

## 2018-03-13 ENCOUNTER — OFFICE VISIT (OUTPATIENT)
Dept: SURGERY | Facility: CLINIC | Age: 78
End: 2018-03-13

## 2018-03-13 VITALS — WEIGHT: 133 LBS | BODY MASS INDEX: 22.71 KG/M2 | TEMPERATURE: 97 F | HEIGHT: 64 IN

## 2018-03-13 DIAGNOSIS — K85.10 ACUTE BILIARY PANCREATITIS, UNSPECIFIED COMPLICATION STATUS: ICD-10-CM

## 2018-03-13 DIAGNOSIS — K80.63 CALCULUS OF GALLBLADDER AND BILE DUCT WITH ACUTE CHOLECYSTITIS, WITH OBSTRUCTION: Primary | ICD-10-CM

## 2018-03-13 PROCEDURE — 99024 POSTOP FOLLOW-UP VISIT: CPT | Performed by: PHYSICIAN ASSISTANT

## 2018-03-13 NOTE — PROGRESS NOTES
Post Operative Visit Note       Active Problems  1. Calculus of gallbladder and bile duct with acute cholecystitis, with obstruction    2.  Acute biliary pancreatitis, unspecified complication status         Chief Complaint   Patient presents with:  Post-Op History:  03/01/2018: LAPAROSCOPIC CHOLECYSTECTOMY  No date: SURG EXPOS,PROSTATE,RADIOACTIV INSRT    The family history and social history have been reviewed by me today.     Family History   Problem Relation Age of Onset   • Cancer Father      liver   • He and urgency. Musculoskeletal: Negative for arthralgias and myalgias. Skin: Negative for color change and rash. Neurological: Negative for tremors, syncope and weakness. Hematological: Negative for adenopathy. Does not bruise/bleed easily.    Psychia pathology, and appropriate aftercare in detail at today's visit. Pathology demonstrated acute and chronic cholecystitis with cholelithiasis. No polyps, malignancy, or other abnormalities were noted.     The patient may begin to reintroduce fatty foods i

## 2018-03-15 NOTE — CDS QUERY
Patient: Gavi iRcketts    : [birth year]    DOS: -3/4/18   MRN: [id number]   CSN: [id number]  Acct#: [de-identified]     Uncertain Diagnosis  Oly Dow  Dear Dr. Mcclellan Query:  Clinical information (provided below) indicates an unknown statu “E coli bacteremia”. 3/2 ID Note:  “E coli sepsis”. 3/2 & 3/3 IM Notes:  “E coli bacteremia, due to cholangitis, cont Zosyn”. 2/24:   WBC=7.6.    Lactic acid=1.8                T=98.4        HR=86        FI=127/79  2/25:   WBC=6.9

## 2019-10-23 NOTE — PROGRESS NOTES
[address] [facility]  TEL: [phone]  FAX: [phone]    Francisco Morrissey Patient Status:  Inpatient    1/15/1940 MRN [id number]   [facility] 4NW-A Attending Mckenzie Melgar MD   Hosp Day # 3 [facility] [address] Pt sitting up in stretcher, awake, clam and cooperative. Risk sitter at bedside. Will continue to closely monitor.    stone and slugge removal on 2/26     3. Biliary pancreatitis due to above     4. Possible aspiration pna- not seen in cxr, may have aspirated w/o resulting in pna. resp status has been stable.  Cough seems much better     PLAN:     -cont ancef  -follow repe

## 2020-01-01 ENCOUNTER — HOSPITAL ENCOUNTER (INPATIENT)
Facility: HOSPITAL | Age: 80
LOS: 9 days | DRG: 177 | End: 2020-01-01
Attending: HOSPITALIST | Admitting: HOSPITALIST
Payer: OTHER MISCELLANEOUS

## 2020-01-01 ENCOUNTER — APPOINTMENT (OUTPATIENT)
Dept: GENERAL RADIOLOGY | Facility: HOSPITAL | Age: 80
End: 2020-01-01
Attending: EMERGENCY MEDICINE
Payer: MEDICARE

## 2020-01-01 ENCOUNTER — HOSPITAL ENCOUNTER (EMERGENCY)
Facility: HOSPITAL | Age: 80
Discharge: NURSING, REHAB OR LONG-TERM CARE | End: 2020-01-01
Attending: EMERGENCY MEDICINE
Payer: MEDICARE

## 2020-01-01 ENCOUNTER — HOSPITAL ENCOUNTER (INPATIENT)
Facility: HOSPITAL | Age: 80
LOS: 5 days | Discharge: HOSPICE | DRG: 177 | End: 2020-01-01
Attending: EMERGENCY MEDICINE | Admitting: HOSPITALIST
Payer: MEDICARE

## 2020-01-01 ENCOUNTER — APPOINTMENT (OUTPATIENT)
Dept: GENERAL RADIOLOGY | Facility: HOSPITAL | Age: 80
DRG: 177 | End: 2020-01-01
Attending: EMERGENCY MEDICINE
Payer: MEDICARE

## 2020-01-01 ENCOUNTER — APPOINTMENT (OUTPATIENT)
Dept: GENERAL RADIOLOGY | Facility: HOSPITAL | Age: 80
DRG: 177 | End: 2020-01-01
Attending: HOSPITALIST
Payer: MEDICARE

## 2020-01-01 VITALS
HEIGHT: 64 IN | DIASTOLIC BLOOD PRESSURE: 67 MMHG | WEIGHT: 134.5 LBS | OXYGEN SATURATION: 81 % | BODY MASS INDEX: 22.96 KG/M2 | TEMPERATURE: 102 F | RESPIRATION RATE: 27 BRPM | HEART RATE: 86 BPM | SYSTOLIC BLOOD PRESSURE: 122 MMHG

## 2020-01-01 VITALS
TEMPERATURE: 99 F | OXYGEN SATURATION: 58 % | RESPIRATION RATE: 18 BRPM | DIASTOLIC BLOOD PRESSURE: 49 MMHG | HEART RATE: 58 BPM | SYSTOLIC BLOOD PRESSURE: 76 MMHG

## 2020-01-01 VITALS
OXYGEN SATURATION: 98 % | BODY MASS INDEX: 23 KG/M2 | WEIGHT: 134.5 LBS | HEART RATE: 55 BPM | RESPIRATION RATE: 18 BRPM | SYSTOLIC BLOOD PRESSURE: 96 MMHG | DIASTOLIC BLOOD PRESSURE: 58 MMHG | TEMPERATURE: 100 F

## 2020-01-01 DIAGNOSIS — J18.9 PNEUMONIA DUE TO INFECTIOUS ORGANISM, UNSPECIFIED LATERALITY, UNSPECIFIED PART OF LUNG: ICD-10-CM

## 2020-01-01 DIAGNOSIS — I95.9 HYPOTENSION, UNSPECIFIED HYPOTENSION TYPE: ICD-10-CM

## 2020-01-01 DIAGNOSIS — R09.02 HYPOXIA: Primary | ICD-10-CM

## 2020-01-01 DIAGNOSIS — B34.9 VIRAL SYNDROME: Primary | ICD-10-CM

## 2020-01-01 DIAGNOSIS — U07.1 COVID-19: ICD-10-CM

## 2020-01-01 PROCEDURE — 81001 URINALYSIS AUTO W/SCOPE: CPT | Performed by: EMERGENCY MEDICINE

## 2020-01-01 PROCEDURE — 87999 UNLISTED MICROBIOLOGY PX: CPT

## 2020-01-01 PROCEDURE — 99232 SBSQ HOSP IP/OBS MODERATE 35: CPT | Performed by: INTERNAL MEDICINE

## 2020-01-01 PROCEDURE — 99233 SBSQ HOSP IP/OBS HIGH 50: CPT | Performed by: HOSPITALIST

## 2020-01-01 PROCEDURE — 99231 SBSQ HOSP IP/OBS SF/LOW 25: CPT | Performed by: HOSPITALIST

## 2020-01-01 PROCEDURE — 99223 1ST HOSP IP/OBS HIGH 75: CPT | Performed by: HOSPITALIST

## 2020-01-01 PROCEDURE — 96361 HYDRATE IV INFUSION ADD-ON: CPT

## 2020-01-01 PROCEDURE — 96360 HYDRATION IV INFUSION INIT: CPT

## 2020-01-01 PROCEDURE — 71045 X-RAY EXAM CHEST 1 VIEW: CPT | Performed by: EMERGENCY MEDICINE

## 2020-01-01 PROCEDURE — 85025 COMPLETE CBC W/AUTO DIFF WBC: CPT | Performed by: EMERGENCY MEDICINE

## 2020-01-01 PROCEDURE — 99284 EMERGENCY DEPT VISIT MOD MDM: CPT

## 2020-01-01 PROCEDURE — 99232 SBSQ HOSP IP/OBS MODERATE 35: CPT | Performed by: HOSPITALIST

## 2020-01-01 PROCEDURE — 80053 COMPREHEN METABOLIC PANEL: CPT | Performed by: EMERGENCY MEDICINE

## 2020-01-01 PROCEDURE — 99239 HOSP IP/OBS DSCHRG MGMT >30: CPT | Performed by: HOSPITALIST

## 2020-01-01 PROCEDURE — 71045 X-RAY EXAM CHEST 1 VIEW: CPT | Performed by: HOSPITALIST

## 2020-01-01 RX ORDER — LORAZEPAM 2 MG/ML
1 INJECTION INTRAMUSCULAR EVERY 4 HOURS PRN
Status: DISCONTINUED | OUTPATIENT
Start: 2020-01-01 | End: 2020-01-01

## 2020-01-01 RX ORDER — LORAZEPAM 0.5 MG/1
0.5 TABLET ORAL EVERY 4 HOURS PRN
Status: DISCONTINUED | OUTPATIENT
Start: 2020-01-01 | End: 2020-01-01

## 2020-01-01 RX ORDER — MORPHINE SULFATE 4 MG/ML
1 INJECTION, SOLUTION INTRAMUSCULAR; INTRAVENOUS
Status: DISCONTINUED | OUTPATIENT
Start: 2020-01-01 | End: 2020-01-01

## 2020-01-01 RX ORDER — CHOLECALCIFEROL (VITAMIN D3) 1250 MCG
1 CAPSULE ORAL
COMMUNITY

## 2020-01-01 RX ORDER — ACETAMINOPHEN 325 MG/1
650 TABLET ORAL EVERY 6 HOURS PRN
Status: DISCONTINUED | OUTPATIENT
Start: 2020-01-01 | End: 2020-01-01

## 2020-01-01 RX ORDER — HEPARIN SODIUM AND DEXTROSE 10000; 5 [USP'U]/100ML; G/100ML
18 INJECTION INTRAVENOUS ONCE
Status: DISCONTINUED | OUTPATIENT
Start: 2020-01-01 | End: 2020-01-01

## 2020-01-01 RX ORDER — HALOPERIDOL 5 MG/ML
1 INJECTION INTRAMUSCULAR
Status: DISCONTINUED | OUTPATIENT
Start: 2020-01-01 | End: 2020-01-01

## 2020-01-01 RX ORDER — POTASSIUM CHLORIDE 1.5 G/1.77G
40 POWDER, FOR SOLUTION ORAL EVERY 4 HOURS
Status: COMPLETED | OUTPATIENT
Start: 2020-01-01 | End: 2020-01-01

## 2020-01-01 RX ORDER — LORAZEPAM 2 MG/ML
0.5 INJECTION INTRAMUSCULAR EVERY 4 HOURS PRN
Status: DISCONTINUED | OUTPATIENT
Start: 2020-01-01 | End: 2020-01-01

## 2020-01-01 RX ORDER — MORPHINE SULFATE 4 MG/ML
1 INJECTION, SOLUTION INTRAMUSCULAR; INTRAVENOUS ONCE
Status: COMPLETED | OUTPATIENT
Start: 2020-01-01 | End: 2020-01-01

## 2020-01-01 RX ORDER — MORPHINE SULFATE 4 MG/ML
INJECTION, SOLUTION INTRAMUSCULAR; INTRAVENOUS
Status: DISPENSED
Start: 2020-01-01 | End: 2020-01-01

## 2020-01-01 RX ORDER — LORAZEPAM 2 MG/ML
2 INJECTION INTRAMUSCULAR EVERY 4 HOURS PRN
Status: DISCONTINUED | OUTPATIENT
Start: 2020-01-01 | End: 2020-01-01

## 2020-01-01 RX ORDER — HEPARIN SODIUM 5000 [USP'U]/ML
5000 INJECTION, SOLUTION INTRAVENOUS; SUBCUTANEOUS EVERY 12 HOURS SCHEDULED
Status: DISCONTINUED | OUTPATIENT
Start: 2020-01-01 | End: 2020-01-01

## 2020-01-01 RX ORDER — FUROSEMIDE 40 MG/1
40 TABLET ORAL EVERY 8 HOURS PRN
Status: DISCONTINUED | OUTPATIENT
Start: 2020-01-01 | End: 2020-01-01

## 2020-01-01 RX ORDER — HEPARIN SODIUM 5000 [USP'U]/ML
80 INJECTION INTRAVENOUS; SUBCUTANEOUS ONCE
Status: DISCONTINUED | OUTPATIENT
Start: 2020-01-01 | End: 2020-01-01

## 2020-01-01 RX ORDER — BACITRACIN 500 [USP'U]/G
OINTMENT TOPICAL AS NEEDED
COMMUNITY

## 2020-01-01 RX ORDER — GLYCOPYRROLATE 0.2 MG/ML
0.2 INJECTION, SOLUTION INTRAMUSCULAR; INTRAVENOUS
Status: DISCONTINUED | OUTPATIENT
Start: 2020-01-01 | End: 2020-01-01

## 2020-01-01 RX ORDER — ATROPINE SULFATE 10 MG/ML
2 SOLUTION/ DROPS OPHTHALMIC EVERY 2 HOUR PRN
Status: DISCONTINUED | OUTPATIENT
Start: 2020-01-01 | End: 2020-01-01

## 2020-01-01 RX ORDER — HYDROXYCHLOROQUINE SULFATE 200 MG/1
200 TABLET, FILM COATED ORAL 2 TIMES DAILY
Status: DISCONTINUED | OUTPATIENT
Start: 2020-01-01 | End: 2020-01-01

## 2020-01-01 RX ORDER — LORAZEPAM 2 MG/ML
0.5 INJECTION INTRAMUSCULAR ONCE
Status: COMPLETED | OUTPATIENT
Start: 2020-01-01 | End: 2020-01-01

## 2020-01-01 RX ORDER — DONEPEZIL HYDROCHLORIDE 5 MG/1
5 TABLET, FILM COATED ORAL NIGHTLY
Status: DISCONTINUED | OUTPATIENT
Start: 2020-01-01 | End: 2020-01-01

## 2020-01-01 RX ORDER — MIDAZOLAM HYDROCHLORIDE 2 MG/ML
330 SYRUP ORAL 2 TIMES DAILY
COMMUNITY

## 2020-01-01 RX ORDER — FUROSEMIDE 10 MG/ML
40 INJECTION INTRAMUSCULAR; INTRAVENOUS EVERY 8 HOURS PRN
Status: DISCONTINUED | OUTPATIENT
Start: 2020-01-01 | End: 2020-01-01

## 2020-01-01 RX ORDER — ACETAMINOPHEN 650 MG/1
650 SUPPOSITORY RECTAL EVERY 6 HOURS PRN
Status: DISCONTINUED | OUTPATIENT
Start: 2020-01-01 | End: 2020-01-01

## 2020-01-01 RX ORDER — MORPHINE SULFATE 4 MG/ML
1 INJECTION, SOLUTION INTRAMUSCULAR; INTRAVENOUS EVERY 4 HOURS PRN
Status: DISCONTINUED | OUTPATIENT
Start: 2020-01-01 | End: 2020-01-01

## 2020-01-01 RX ORDER — POTASSIUM CHLORIDE 1.5 G/1.77G
40 POWDER, FOR SOLUTION ORAL EVERY 4 HOURS
Status: ACTIVE | OUTPATIENT
Start: 2020-01-01 | End: 2020-01-01

## 2020-01-01 RX ORDER — HYDROXYCHLOROQUINE SULFATE 200 MG/1
200 TABLET, FILM COATED ORAL 2 TIMES DAILY
COMMUNITY

## 2020-01-01 RX ORDER — LORAZEPAM 1 MG/1
2 TABLET ORAL EVERY 4 HOURS PRN
Status: DISCONTINUED | OUTPATIENT
Start: 2020-01-01 | End: 2020-01-01

## 2020-01-01 RX ORDER — GINSENG 100 MG
1 CAPSULE ORAL 2 TIMES DAILY
COMMUNITY
Start: 2020-01-01 | End: 2020-01-01

## 2020-01-01 RX ORDER — HEPARIN SODIUM 5000 [USP'U]/ML
5000 INJECTION, SOLUTION INTRAVENOUS; SUBCUTANEOUS ONCE
Status: COMPLETED | OUTPATIENT
Start: 2020-01-01 | End: 2020-01-01

## 2020-01-01 RX ORDER — HYDROCHLOROTHIAZIDE 12.5 MG/1
12.5 CAPSULE, GELATIN COATED ORAL EVERY OTHER DAY
COMMUNITY

## 2020-01-01 RX ORDER — GLYCOPYRROLATE 0.2 MG/ML
0.4 INJECTION, SOLUTION INTRAMUSCULAR; INTRAVENOUS
Status: DISCONTINUED | OUTPATIENT
Start: 2020-01-01 | End: 2020-01-01

## 2020-01-01 RX ORDER — DEXTROSE MONOHYDRATE 50 MG/ML
INJECTION, SOLUTION INTRAVENOUS CONTINUOUS
Status: DISCONTINUED | OUTPATIENT
Start: 2020-01-01 | End: 2020-01-01

## 2020-01-01 RX ORDER — MORPHINE SULFATE 4 MG/ML
INJECTION, SOLUTION INTRAMUSCULAR; INTRAVENOUS
Status: COMPLETED
Start: 2020-01-01 | End: 2020-01-01

## 2020-01-01 RX ORDER — LORAZEPAM 2 MG/ML
1 INJECTION INTRAMUSCULAR ONCE
Status: COMPLETED | OUTPATIENT
Start: 2020-01-01 | End: 2020-01-01

## 2020-01-01 RX ORDER — LORAZEPAM 2 MG/ML
INJECTION INTRAMUSCULAR
Status: DISCONTINUED
Start: 2020-01-01 | End: 2020-01-01

## 2020-01-01 RX ORDER — HALOPERIDOL 5 MG/ML
2 INJECTION INTRAMUSCULAR
Status: DISCONTINUED | OUTPATIENT
Start: 2020-01-01 | End: 2020-01-01

## 2020-01-01 RX ORDER — HEPARIN SODIUM AND DEXTROSE 10000; 5 [USP'U]/100ML; G/100ML
INJECTION INTRAVENOUS CONTINUOUS
Status: DISCONTINUED | OUTPATIENT
Start: 2020-01-01 | End: 2020-01-01

## 2020-01-01 RX ORDER — LORAZEPAM 1 MG/1
1 TABLET ORAL EVERY 4 HOURS PRN
Status: DISCONTINUED | OUTPATIENT
Start: 2020-01-01 | End: 2020-01-01

## 2020-01-01 RX ORDER — ATROPINE SULFATE 10 MG/ML
2 SOLUTION/ DROPS OPHTHALMIC EVERY 2 HOUR PRN
Status: CANCELLED | OUTPATIENT
Start: 2020-01-01

## 2020-01-01 RX ORDER — ENOXAPARIN SODIUM 100 MG/ML
30 INJECTION SUBCUTANEOUS DAILY
Status: DISCONTINUED | OUTPATIENT
Start: 2020-01-01 | End: 2020-01-01

## 2020-01-01 RX ORDER — BISACODYL 10 MG
10 SUPPOSITORY, RECTAL RECTAL
Status: DISCONTINUED | OUTPATIENT
Start: 2020-01-01 | End: 2020-01-01

## 2020-01-01 RX ORDER — SENNOSIDES 8.6 MG
8.6 TABLET ORAL DAILY
COMMUNITY

## 2020-01-01 RX ORDER — CYANOCOBALAMIN 1000 UG/ML
1000 INJECTION INTRAMUSCULAR; SUBCUTANEOUS
COMMUNITY

## 2020-01-01 RX ORDER — ACETAMINOPHEN 650 MG/1
650 SUPPOSITORY RECTAL EVERY 6 HOURS PRN
Status: CANCELLED | OUTPATIENT
Start: 2020-01-01

## 2020-01-01 RX ORDER — MORPHINE SULFATE 10 MG/5ML
5 SOLUTION ORAL
Status: DISCONTINUED | OUTPATIENT
Start: 2020-01-01 | End: 2020-01-01

## 2020-01-01 RX ORDER — SCOLOPAMINE TRANSDERMAL SYSTEM 1 MG/1
1 PATCH, EXTENDED RELEASE TRANSDERMAL
Status: DISCONTINUED | OUTPATIENT
Start: 2020-01-01 | End: 2020-01-01

## 2020-01-01 RX ORDER — ACETAMINOPHEN 650 MG/1
650 SUPPOSITORY RECTAL ONCE
Status: COMPLETED | OUTPATIENT
Start: 2020-01-01 | End: 2020-01-01

## 2020-01-01 RX ORDER — DONEPEZIL HYDROCHLORIDE 10 MG/1
10 TABLET, FILM COATED ORAL NIGHTLY
Status: DISCONTINUED | OUTPATIENT
Start: 2020-01-01 | End: 2020-01-01

## 2020-01-01 RX ORDER — ENOXAPARIN SODIUM 100 MG/ML
40 INJECTION SUBCUTANEOUS DAILY
Status: DISCONTINUED | OUTPATIENT
Start: 2020-01-01 | End: 2020-01-01

## 2020-01-01 RX ORDER — ARIPIPRAZOLE 15 MG/1
20 TABLET ORAL DAILY
COMMUNITY

## 2020-01-01 RX ORDER — AZITHROMYCIN 250 MG/1
TABLET, FILM COATED ORAL
Qty: 1 PACKAGE | Refills: 0 | Status: SHIPPED | OUTPATIENT
Start: 2020-01-01 | End: 2020-01-01

## 2020-01-01 RX ORDER — HEPARIN SODIUM 5000 [USP'U]/ML
5000 INJECTION, SOLUTION INTRAVENOUS; SUBCUTANEOUS EVERY 12 HOURS SCHEDULED
Status: COMPLETED | OUTPATIENT
Start: 2020-01-01 | End: 2020-01-01

## 2020-01-01 RX ORDER — ASPIRIN 81 MG/1
81 TABLET, CHEWABLE ORAL DAILY
Status: DISCONTINUED | OUTPATIENT
Start: 2020-01-01 | End: 2020-01-01

## 2020-04-08 NOTE — ED INITIAL ASSESSMENT (HPI)
Pt from [facility]. C/o fever since 1400, no Tylenol given at [facility]. Pt has hx of dementia, only speaks Manderin.  Interpretor line attempted, pt non-verbal  W/ and staff

## 2020-04-08 NOTE — ED PROVIDER NOTES
Patient Seen in: [facility] Emergency Department      History   Patient presents with:  Fever  Fatigue  Dyspnea DIVYA SOB    Stated Complaint: fever, weakness    HPI    This is an 79-year-old man, history of dementia, sent from [address] [place] [address] for Resp 20   Wt 61 kg   SpO2 95%   BMI 23.08 kg/m²         Physical Exam        Physical Exam  Vitals signs and nursing note reviewed. General: Elderly man sitting in bed in no acute distress  Head: Normocephalic and atraumatic.    Mouth/Throat:  Mucous memb RAINBOW DRAW LIGHT GREEN   RAINBOW DRAW GOLD   SARS-COV-2 BY PCR          Xr Chest Ap Portable  (cpt=71045)    Result Date: 4/7/2020  PROCEDURE:  XR CHEST AP PORTABLE  (CPT=71045)  TECHNIQUE:  AP chest radiograph was obtained.   COMPARISON:  NEEMA MARINELLI, day      [facility] Emergency Department  [place]  [address]  [address] [facility]  [phone]    As needed, If symptoms worsen        Medications Prescribed:  Current Discharge Medication List    START taking these medications    andrés

## 2020-04-12 PROBLEM — R09.02 HYPOXIA: Status: ACTIVE | Noted: 2020-01-01

## 2020-04-13 PROBLEM — U07.1 COVID-19: Status: ACTIVE | Noted: 2020-01-01

## 2020-04-13 PROBLEM — I95.9 HYPOTENSION, UNSPECIFIED HYPOTENSION TYPE: Status: ACTIVE | Noted: 2020-01-01

## 2020-04-13 NOTE — PLAN OF CARE
Bathed and changed gown and linens. Pt resisted, but able to complete. Attempted oral care, pt refused. Sarbjit Heels red, unblanchable. Elevated heels off bed. Pt also has many small round sores over entire body in various stages of healing.   Also has la

## 2020-04-13 NOTE — H&P
[facility]ITAL[facility]  History and Physical     [facility] Patient Status:  Emergency    1/15/1940 MRN [id number]   Location [address] [facility] Attending Zachery Mahmood MD   Hosp Day # 0 PCP Elisabeth Causey MD     Chief Complaint: Fever Disorder Neg     no hx of cad    Allergies: No Known Allergies    Medications:  No current facility-administered medications on file prior to encounter.    azithromycin (ZITHROMAX Z-TOMMY) 250 MG Oral Tab, 500 mg once followed by 250 mg daily x 4 days, Disp: Labs:  Recent Labs   Lab 04/07/20 1936   WBC 6.6   HGB 12.9*   MCV 95.9   .0*       Recent Labs   Lab 04/07/20 1936   *   BUN 23*   CREATSERUM 1.45*   GFRAA 52*   GFRNAA 45*   CA 8.1*   ALB 3.2*      K 3.5      CO2 24.0   AL

## 2020-04-13 NOTE — OCCUPATIONAL THERAPY NOTE
OT order received, chart reviewed, pt seen by this department in 2018, pt in LTC and roral assist at that time and not able to participate in therapy, OT will sign off as no skilled needs, rec return to LTC.

## 2020-04-13 NOTE — CONSULTS
Pulmonary / Critical Care H&P/Consult       NAME: Fatou Damon: [id number] - MRN: [id number] - Age: 79year old - :  1/15/1940    Date of Admission: 2020 10:35 PM  Admission Diagnosis: Hypoxia [R09.02]  Hypotension, unspecified hypotension type strain: Not on file      Food insecurity:        Worry: Not on file        Inability: Not on file      Transportation needs:        Medical: Not on file        Non-medical: Not on file    Tobacco Use      Smoking status: Never Smoker      Smokeless tobacco mouth daily. , Disp: , Rfl:   zinc oxide 20 % External Ointment, Apply topically as needed for Dry Skin., Disp: , Rfl:   Probiotic Product (RISAQUAD) Oral Cap, Take 1 capsule by mouth daily.   , Disp: , Rfl:   aspirin 81 MG Oral Chew Tab, Chew 81 mg by mouth deformity   Heart:    Regular rate and rhythm, S1 and S2 normal, no murmur, rub   or gallop   Abdomen:     Soft, non-tender, bowel sounds active all four quadrants,     no masses, no organomegaly   Extremities:   Extremities normal, atraumatic, no cyanosis response  - consider tocilizumab if there is clinical deterioration  Dispo: DNR/DNI. D/w daughter over the phone.          Bernard Wells M.D.  [facility] Pulmonary / Critical Care  4/13/2020

## 2020-04-13 NOTE — PLAN OF CARE
Confused, does not respond to verbal queues, [address] Jocelyn Mckeon RN stated he speaks Mandarin, was non-verbal even with .   Vitals stable, on RA, mouth breather, no cough observed, Hx of incontinence, adult brief on, on Cardiac Pureed diet, Bl Cx & UA c Cx pendi

## 2020-04-13 NOTE — CONSULTS
INFECTIOUS DISEASE CONSULT NOTE    Erasmo Pasadena Patient Status:  Inpatient    1/15/1940 MRN [id number]   [facility] 5NW-A Attending Melonie Morse MD   Hosp Day # 0 PCP Renny Erwin MD Allergies    Medications:    Current Facility-Administered Medications:   •  aspirin chewable tab 81 mg, 81 mg, Oral, Daily  •  Piperacillin Sod-Tazobactam So (ZOSYN) 3.375 g in dextrose 5 % 100 mL ADD-vantage, 3.375 g, Intravenous, Q8H  •  Donepezil HCl ( Microbiology    Reviewed in EMR,     Radiology: Xr Chest Ap Portable  (cpt=71045)    Result Date: 4/12/2020  PROCEDURE:  XR CHEST AP PORTABLE  (CPT=71045)  TECHNIQUE:  AP chest radiograph was obtained.   COMPARISON:  [facility] , XR, XR CHEST AP PORTABLE his PCT is neg and CXR with increased interstitial markings rather than lobar consolidation  - cont HCQ and azithro  - d/c other abx  - follow O2 sats  - could try to self prone if he is able to cooperate    2.  Acute resp failure due to above- better today

## 2020-04-13 NOTE — PLAN OF CARE
Spoke with Reena Overall, patient's daughter and POA, daughter was very helpful, patient likes to be called \"Pa,\" and he does not speak Mandarin, he speaks Cantonese. She would like her father to be on a probiotic, will relay request to Dr. Sheeba Keane.

## 2020-04-13 NOTE — PLAN OF CARE
Jusitno Camejo, daughter, from pt room to [facility]. Daughter talked to pt, pt did not respond verbally, opened eyes a couple of times. Pt refusing to eat or drink.   Called Dr. Jordan Hoffman office to relay daughter's request for probiotic, requested return call

## 2020-04-13 NOTE — CM/SW NOTE
04/13/20 1200   CM/SW Referral Data   Referral Source Social Work (self-referral)   Reason for Referral Discharge [facility] Staff   Social History   Recreational Drug/Alcohol Use no   Major Changes Last 6 Months no   Domestic/Partner V

## 2020-04-13 NOTE — ED PROVIDER NOTES
Patient Seen in: [facility] Emergency Department      History   Patient presents with:  Hypotension  Dyspnea DIVYA SOB    Stated Complaint:     HPI    The patient is an 28-year-old male with multiple past medical problems, including dementia, who resi Ht 162.6 cm (5' 4\")   Wt 61 kg   SpO2 100%   BMI 23.08 kg/m²         Physical Exam    General: Frail-appearing elderly male lying back in recumbent position, Alert and oriented in no distress. Neuro: No focal neurologic deficits.   There is some generaliz components within normal limits   D-DIMER - Abnormal; Notable for the following components:    D-Dimer 1.97 (*)     All other components within normal limits   URINALYSIS WITH CULTURE REFLEX - Abnormal; Notable for the following components:    Urine Color CHEST AP PORTABLE  (CPT=71045), 4/07/2020,     8:52 PM.         INDICATIONS:  COVID+, hypoxia, hypotension         PATIENT STATED HISTORY: (As transcribed by Technologist)  Patient with     hypoxia, hypotension, COVID+.                FINDINGS:  Patient is admitted in stable condition.           Admission disposition: 4/12/2020 10:57 PM                   Disposition and Plan     Clinical Impression:  Hypoxia  (primary encounter diagnosis)  Hypotension, unspecified hypotension type  COVID-19    Disposition:  A

## 2020-04-13 NOTE — PROGRESS NOTES
Received pt from ER via transport  Pt with N95 mask on  Alert and responds to name  Nonverbal during assesment  Admit navigator completed with NH paperwork  Vs wnl, 96-99% on RA  Maintain high fall precautions  Isolation precautions  PT/OT to assess  ST to

## 2020-04-13 NOTE — PROGRESS NOTES
[facility] Pharmacy Note:  Renal Dose Adjustment for Enoxaparin (LOVENOX)    Emily Chamberlain has been prescribed Enoxaparin (LOVENOX) 40 mg subcutaneously every 24 hours. Estimated Creatinine Clearance: 25.1 mL/min (A) (based on SCr of 1.93 mg/dL (H)).     His calcu

## 2020-04-13 NOTE — PHYSICAL THERAPY NOTE
PT order received, chart reviewed. Pt found to be COVID19+. Pt admitted from [address] [place] [address]. Per previous chart, pt has long been unable to follow commands to meaningfully participate in any therapy session (dating back to at least 2018).   Will dc as skilled inter

## 2020-04-13 NOTE — SLP NOTE
Chart reviewed with orders received. Patient currently on isolation for COVID-19. Order received due to protocol as he has a history of modified diet consistency. Spoke with RN who reported patient is quite lethargic and has not yet taken po for her.

## 2020-04-13 NOTE — PLAN OF CARE
Administered Lovenox as ordered this am, new order to start Heparin and discontinue Lovenox. Per [facility], Lovenox should start at 2100 for 1st dose. Did not give am dose.

## 2020-04-13 NOTE — ED INITIAL ASSESSMENT (HPI)
Patient arrived via EMS from [address] [facility] [address]. Patient is A&Ox1. Per EMS daughter wanted patient brought to the hospital to be evaluated due to COVID+ status.

## 2020-04-14 NOTE — PROGRESS NOTES
INFECTIOUS DISEASE PROGRESS NOTE    Francisco Morrissey Patient Status:  Inpatient    1/15/1940 MRN [id number]   [facility] 5NW-A Attending Lorenza Dominguez MD   [facility] Day # 1 PCP Ashley Jackson UROBILINOGEN <2.0   NITRITE Negative   LEUUR Negative   WBCUR 11-20*   RBCUR 6-10*   BACUR 1+*   EPIUR None Seen     COVID-19 Lab Results    COVID-19  Lab Results   Component Value Date    COVID19 Detected (AA) 04/07/2020       Pro-Calcitonin  Recent Lab 7:02 AM.  INDICATIONS:  fever, weakness  PATIENT STATED HISTORY: (As transcribed by Technologist)  Patient offered no additional history at this time. CONCLUSION:  Heart size and pulmonary vascularity is within normal limits.   Patchy infiltrate in the l

## 2020-04-14 NOTE — SLP NOTE
This patient is COVID-19 positive.  For purposes of responsible PPE use in this time of PPE shortage during COVID-19 pandemic and to limit possibility of further viral transmission, the following is a follow up completed without examining the patient at bed

## 2020-04-14 NOTE — PLAN OF CARE
Patient non-verbal today, needs to be fed, ate bowl of cream of wheat, drank ensure, glass of orange juice for breakfast. Dr. Cookie Pina notified that patient's daughter, Anisha Pires, does not want her father to have plaquenil anymore due to her worrying about his

## 2020-04-14 NOTE — PLAN OF CARE
Pt confused at baseline. Hx of dementia. Pt non-verbal. On room air. Can be combative/irritable at times. Pt febrile. Prn Tylenol given. SCDs to BLE. IV Zithromax Q24. Plaquenil BID. Pt incontinent. Brief in place. IVF continued. New IV placed in R hand.  O 74

## 2020-04-14 NOTE — PLAN OF CARE
Pt pulled out IV from R Hand, still has L AC, but that is a field stick and needs to be removed. Left in for IV Abx, endorsed to [address] Jocelyn Mckeon RN insert new IV and remove field stick.

## 2020-04-14 NOTE — PROGRESS NOTES
Spoke with pt's daughter, Anisha Pires, on the phone and gave an updated plan of care.  Anisha Pires is concerned about continuing Plaquenil for today and would like the AM dose held until she is able to talk to the hospitalist. Anisha Pires also states she would like pulmon

## 2020-04-14 NOTE — PROGRESS NOTES
[facility]  Progress Note    Arabella Lazaro Patient Status:  Inpatient    1/15/1940 MRN [id number]   [facility] 5NW-A Attending Татьяна Hamlin MD   Hosp Day # 1 PCP Shelby Mcrae MD     CC: Fever, chills, shortness of breath, hypoxia    S CREATSERUM 1.35 04/14/2020    BUN 31 04/14/2020     04/14/2020    K 3.4 04/14/2020     04/14/2020    CO2 24.0 04/14/2020     04/14/2020    CA 8.2 04/14/2020    ALB 2.4 04/14/2020    ALKPHO 53 04/14/2020    BILT 0.6 04/14/2020    TP 6. hypoxia and bibasilar interstitial pneumonia on presentation due to COVID-19  1. Patient recently treated with Plaquenil and azithromycin for positive COVID-19, Plaquenil discontinued today as discussed with patient's daughter over phone.    2. Pulmonary on

## 2020-04-14 NOTE — PROGRESS NOTES
Pulmonary Progress Note        NAME: Placido Bianchi - ROOM: 565/282-G - MRN: [id number] - Age: 79year old - : 1/15/1940        Last 24hrs: No events overnight, remains stable from a pulm perspective    OBJECTIVE:   20  0335 20  0527 20  09 there is clinical deterioration  3.  Dispo: DNR/DNI  -discussed w/ daughter who is an APN, over the phone,  She has mult concerns about the patient's condition none of which relate to his pulmonary status, I've advised her to reach out to the hospitalist ag

## 2020-04-15 NOTE — PROGRESS NOTES
INFECTIOUS DISEASE PROGRESS NOTE    Irene Vasques Patient Status:  Inpatient    1/15/1940 MRN [id number]   [facility] 5NW-A Attending Ruben Renee MD   [facility] Day # 2 PCP Chivo Cobos Negative   PHURINE 5.0   PROUR 30 *   UROBILINOGEN <2.0   NITRITE Negative   LEUUR Negative   WBCUR 11-20*   RBCUR 6-10*   BACUR 1+*   EPIUR None Seen     COVID-19 Lab Results    COVID-19  Lab Results   Component Value Date    COVID19 Detected (AA) 04/07/2

## 2020-04-15 NOTE — PLAN OF CARE
Assumed care of patient at 0730. Confused, dementia, TYE orientation. Grabbing everything    Shows no signs of Chest pain, sob, Lightheadedness, dizziness. Bed rest turn Q 2.  2 person turn. Bd alarm on.           Problem: Delirium  Goal: Minimize dur appropriate  - Consider OT/PT consult to assist with strengthening/mobility  - Encourage toileting schedule  Outcome: Progressing     Problem: RESPIRATORY - ADULT  Goal: Achieves optimal ventilation and oxygenation  Description  INTERVENTIONS:  - Assess fo

## 2020-04-15 NOTE — PROGRESS NOTES
[facility]  Progress Note    Roberto Light Patient Status:  Inpatient    1/15/1940 MRN [id number]   [facility] 5NW-A Attending Marilin Almodovar MD   Hosp Day # 2 PCP Mary Meyers MD     CC: Fever, chills, shortness of breath, hypoxia    S 0.6 04/15/2020    TP 6.7 04/15/2020     04/15/2020    ALT 69 04/15/2020    MG 2.6 04/14/2020       Imaging:  XR CHEST AP PORTABLE  (CPT=71045)     TECHNIQUE:  AP chest radiograph was obtained.      COMPARISON:  [facility] , XR, XR CHEST AP PORTABLE  (CPT appears to be clinically improving. Will cont to trend. If down trends can likely DC home  2. Dementia  1. Continue home medication Aricept  3. Hypertension  1. Home medications hydrochlorothiazide on hold. 4. Acute kidney injury due to dehydration  1.  H

## 2020-04-15 NOTE — PROGRESS NOTES
[address] Patient Status:  Inpatient    1/15/1940 MRN [id number]   [facility] 5NW-A Attending Joe Carranza MD   Hosp Day # 2 PCP Renny Erwin MD     SUBJECTIVE:no new events. Remains on room air.      OBJECTIVE:  BP 1 1.10 03/01/2018    INR 1.07 12/24/2013         COVID-19 Lab Results    COVID-19  Lab Results   Component Value Date    COVID19 Detected (AA) 04/07/2020       Pro-Calcitonin  Recent Labs   Lab 04/12/20  2320   PCT 0.15       Cardiac  Recent Labs   Lab 04/12

## 2020-04-15 NOTE — PLAN OF CARE
Assumed care of patient at 1900. Alert, nonverbal, agitated upon initial assessment, patient pulled out IV. Patient with tendency to grab and pull things. New IV placed to Left forearm and concealed with kerlix and ace wrap.  SB/SR on tele, with heart rate

## 2020-04-16 NOTE — PROGRESS NOTES
Spoke with pt's daughter, Yovani, and updated her on the pt's condition and plan of care. She is aware the pt's is restrained with soft wrist restraints.

## 2020-04-16 NOTE — PROGRESS NOTES
Weaning patient oxygen down to 3L/nasal cannula. Oxygen saturation maintaining 93-95%. Reposition patient to his right side, continue using bilateral soft wrist restraint. Bilateral bunny boots on.

## 2020-04-16 NOTE — PROGRESS NOTES
[facility]  Progress Note    Valdo Christine Patient Status:  Inpatient    1/15/1940 MRN [id number]   [facility] 5NW-A Attending Jyothi Ballard MD   Hosp Day # 3 PCP Enrique Hardy MD     CC: Fever, chills, shortness of breath, hypoxia    S 04/16/2020    ALB 2.6 04/16/2020    ALKPHO 59 04/16/2020    BILT 0.6 04/16/2020    TP 6.8 04/16/2020     04/16/2020    ALT 66 04/16/2020    DDIMER 1.68 04/16/2020    CRP 12.40 04/16/2020       Imaging:  XR CHEST AP PORTABLE  (CPT=71045)     TECHNIQU air.    4. ID consult  5. Continue to monitor closely. 6. CRP jumped to 12  7. O2 needs up significantly but now back down to 3L. 8. CXR worse w/ multifocal pna but not ARDS  9. D/w ID and pulm.  Agree to hold off on actemra but will start if any further

## 2020-04-16 NOTE — PLAN OF CARE
Pt confused with hx of dementia. Non-verbal. Very agitated at shift change and O2 saturation declining. O2 titrated up to 15L via high flow nasal cannula. Slowly weaning down oxygen. On 10L high flow nasal cannula currently.  Soft-wrist restraints in place

## 2020-04-16 NOTE — PROGRESS NOTES
[address] Patient Status:  Inpatient    1/15/1940 MRN [id number]   [facility] 5NW-A Attending Tedd Lesch, MD   [facility] Day # 3 PCP Paola Farias MD     Pulm / Critical Care Progress Note     S: asked to re-evaluate mery --  24* 25*     CREATININE (mg/dL)   Date Value   12/26/2013 0.90   12/25/2013 0.91   12/24/2013 1.34 (H)     Creatinine (mg/dL)   Date Value   04/16/2020 1.36 (H)   04/15/2020 1.31 (H)   04/14/2020 1.35 (H)   ]    Recent Labs   Lab 04/14/20  6845 04/15/20

## 2020-04-16 NOTE — PLAN OF CARE
Patient received drowsy, able to opened his eyes when his named called out. Oxygen at 10L/nasal cannula. O2 sat 92% but pulse oximeter has no good pleth.   Replace pulse oximeter and placed on his right hand middle finger, oxygen sat at 96%, wean oxygn ben

## 2020-04-16 NOTE — PROGRESS NOTES
Assumed care of pt at 1900. Pt's O2 saturation declining during this time to 84% on room air with increased agitation. Titrated oxygen up to 10L via non-rebreather mask.  Dr Jossy Starr paged in regard to pt's O2 saturation declining on room air with increased a

## 2020-04-17 NOTE — PLAN OF CARE
Received the patient resting in bed ,patient is confused ,nonverbal ,opens eyes when called ,on BL wrist restraints as patient is pulling on o2 and tele leads ,has low grade temp of 100.5 ,tylenol given ,blood pressure stable ,o2 sat 92% on 3L O2  via high

## 2020-04-17 NOTE — PLAN OF CARE
Assumed pt this am on O2 5L/NC with SPO2 91%. Pt confused and nonverbal per baseline. Bilateral soft wrist restraints maintained per order for confusion and attempting to pull at medical devices. Tolerated 50% of breakfast when fed.  Aspiration precautions

## 2020-04-17 NOTE — PLAN OF CARE
pts blood pressure  86/59 ,o2 sat 87% on 3L of o2 ,increase o2 to 5L via cannula sats up to 91% ,no resp distress noted ,afebrile now ,notified Waqar Handley regarding BP and o2 sats ,no new order's will continue to monitor .

## 2020-04-17 NOTE — PROGRESS NOTES
[address] Patient Status:  Inpatient    1/15/1940 MRN [id number]   [facility] 5NW-A Attending Eduardo Dill MD   Hosp Day # 4 PCP Maverick Zabala MD     SUBJECTIVE: Pt with low grade fever overnight, he's mumbling uninte 04/17/2020    ALT 70 04/17/2020    AST 99 04/17/2020    BILT 0.6 04/17/2020    ALB 2.1 04/17/2020    TP 6.0 04/17/2020     Lab Results   Component Value Date    PT 13.5 12/24/2013    INR 1.10 03/01/2018    INR 1.07 12/24/2013         COVID-19 Lab Results

## 2020-04-17 NOTE — CM/SW NOTE
Care Progression Note:  Active Acute Medical Issue:   COVID-19  PNA initial dx on 4/7, came in with worsening fevers and hypoxia; currently on 5LNC; s/p Plaquenil, azrithro  LAURA due to deydration  Pt remains on wrists restraints for to prevent pulling on O

## 2020-04-17 NOTE — CM/SW NOTE
3:18pm  MSW sent updates to [address] [place] [address]. Nursing home via [facility].

## 2020-04-17 NOTE — PROGRESS NOTES
INFECTIOUS DISEASE PROGRESS NOTE    Oneita Harms Patient Status:  Inpatient    1/15/1940 MRN [id number]   [facility] 5NW-A Attending MD Renard Arthur Day # 4 PCP Lawrence Alvarado [place] None Seen     COVID-19 Lab Results    COVID-19  Lab Results   Component Value Date    COVID19 Detected (AA) 04/07/2020       Pro-Calcitonin  Recent Labs   Lab 04/12/20 2320 04/16/20  0634   PCT 0.15 0.18*       Cardiac  Recent Labs   Lab 04/12/20 2320 with pna  - completed HCQ and azithro  - still with increased O2 requirements. New cxr with increased infiltrates  - suspect all related to covid 19 rather than superimposed bacterial infection.  PCT a little worse but no more fevers and wbc has been nl  -

## 2020-04-17 NOTE — PROGRESS NOTES
[facility]  Progress Note    Katie Fall Patient Status:  Inpatient    1/15/1940 MRN [id number]   [facility] 5NW-A Attending Chelo Soler MD   Hosp Day # 4 PCP Nel Beard MD     CC: Fever, chills, shortness of breath, hypoxia    S 04/17/2020    CRP 10.60 04/17/2020       Imaging:  XR CHEST AP PORTABLE  (CPT=71045)     TECHNIQUE:  AP chest radiograph was obtained.      COMPARISON:  [facility] , XR, XR CHEST AP PORTABLE  (CPT=71045), 4/07/2020, 8:52 PM.     INDICATIONS:  COVID+, hypoxia, h significantly but now back down to 3L. 8. CXR worse w/ multifocal pna but not ARDS  9. D/w dtr who declined actemra. Wishes to cont current therapy, if crashes she would consider hospice at that time  3. Dementia  1. Continue home medication Aricept  4.

## 2020-04-18 NOTE — CM/SW NOTE
SW spoke to pt's daughter, Keara Terrazas Ph: [phone] to discuss her request for Hospice. Keara Terrazas confirms that she would like Hospice initiated and has chosen [facility]. SW completed a referral to [facility] and updated RN.     Social wo

## 2020-04-18 NOTE — HOSPICE RN NOTE
Lethargic becomes agitated with movement. Pain ad 2 due to dyspnea. And respirations labored are 36/min. Decreased breath sounds in bases. O2 at 10L/NC and HOB elevated 90 degrees. Has Morphine drip at 1mg/hour.  Will give Morphine 1mg IVP and Ativan 0.5mg

## 2020-04-18 NOTE — PLAN OF CARE
Patient is not alert or oriented. Patient gets agitated when his oxygen saturations are below 90, currently on 15 liters of HFNC. Morphine given for comfort. Haldol given for agitation. Place condom cath due to incontinence. Patient picks at his skin. injury  Description  INTERVENTIONS:  - Assess pt frequently for physical needs  - Identify cognitive and physical deficits and behaviors that affect risk of falls.   - [place] fall precautions as indicated by assessment.  - Educate pt/family on patient sa Progressing

## 2020-04-18 NOTE — PLAN OF CARE
Pt in bed. Unable to arouse with calling pt's name. When trying to remove extra blankets, pt reacted and grabbed at blanket. Temp 102 this am. Comfort care order set in place. Restraints still in place.  Left soft wrist restraint removed this am. Right gerda

## 2020-04-18 NOTE — PROGRESS NOTES
[facility]  Progress Note    Placido Bianchi Patient Status:  Inpatient    1/15/1940 MRN [id number]   [facility] 5NW-A Attending Daisy Thorpe MD   Hosp Day # 5 PCP Neville Parra MD     CC: Fever, chills, shortness of breath, hypoxia    S 04/18/2020    ALKPHO 81 04/18/2020    BILT 0.8 04/18/2020    TP 6.3 04/18/2020     04/18/2020    ALT 82 04/18/2020    DDIMER 1.81 04/18/2020    CRP 12.90 04/18/2020    PGLU 98 04/17/2020       Imaging:  XR CHEST AP PORTABLE  (CPT=71045)     TECHNIQU PRN  acetaminophen (TYLENOL) 650 MG rectal suppository 650 mg, 650 mg, Rectal, Q6H PRN  morphINE sulfate (PF) 4 MG/ML injection 1 mg, 1 mg, Intravenous, Q1H PRN  haloperidol lactate (HALDOL) 5 MG/ML injection 1 mg, 1 mg, Intravenous, Q1H PRN    Or  haloper

## 2020-04-18 NOTE — PROGRESS NOTES
INFECTIOUS DISEASE PROGRESS NOTE (TELEMEDICINE NOTE)    Nishi Atkins Patient Status:  Inpatient    1/15/1940 MRN [id number]   [facility] 5NW-A Attending Yudi Hager MD   [facility] Day # 5 examination deferred in setting of COVID-19 pandemic; please see hospitalist service notes    Laboratory Data:    Recent Labs   Lab 04/15/20  0706 04/16/20  0634 04/18/20  0652   RBC 4.09 4.15 4.04   HGB 12.6* 12.9* 12.4*   HCT 39.7 39.8 38.1*   MCV 97.1 9 Blood Culture Result No Growth 5 Days N/A   2. URINE CULTURE, ROUTINE     Status: None    Collection Time: 04/12/20 11:20 PM   Result Value Ref Range    Urine Culture No Growth at 18-24 hrs.  N/A       Radiology: reviewed  PROCEDURE: XR CHEST AP PORTABLE

## 2020-04-18 NOTE — HOSPICE RN NOTE
[facility] discussed the POC with Dr Michael Davis and Dr Arnold Collier. Patient to be admitted with DX of COVID and we will manage his dyspnea with Morphine IVP and Morphine drip as needed POC was discussed with Rebecca HAMMER.

## 2020-04-18 NOTE — PROGRESS NOTES
Upon assessment of the patient, he appeared agitated and was breathing very rapidly and shallow. Lungs are diminished. No medications were available to be given at this time. Carolyn ROBERTS, requested writing RN speak with family about hospice.  Writing RN spoke

## 2020-04-18 NOTE — HOSPICE RN NOTE
Stanford Tamez RN along with Ashok CHIU received referral for [facility]. Call placed to patient dtr Brisa Mckeon to discuss services. She will call back shortly with her availability to sign Hospice consents for services to begin.

## 2020-04-19 NOTE — PROGRESS NOTES
[facility]  Progress Note    Puma Jimenez Patient Status:  Inpatient    1/15/1940 MRN [id number]   [facility] 5NW-A Attending Rosa Roberson MD   Hosp Day # 1 PCP Figueroa Russo MD     CC: Fever, chills, shortness of breath, hypoxia    S by: Deniz Ramsey MD on 4/12/2020 at 11:56 PM         Meds:   Atropine Sulfate 1 % ophthalmic solution 2 drop, 2 drop, Oral, Q2H PRN  acetaminophen (TYLENOL) 650 MG rectal suppository 650 mg, 650 mg, Rectal, Q6H PRN  morphINE 1 mg/mL infusion, 1 mg/hr, Rashaun Lorenzo dehydration  1. Home hydrochlorothiazide on hold  6. Hypernatremia  1. Dc IVF  7. Thrombocytopenia  8.  Elevated liver function tests    Quality:  · DVT Prophylaxis: hospice  · CODE status: DNR  · Kemp: no  · Central line: no    Will the patient be referre

## 2020-04-19 NOTE — PROGRESS NOTES
04/18/20 1946   Clinical Encounter Type   Visited With Health care provider  (Pt is non-verbal  [employer] offered t o the nurse)   Continue Visiting No

## 2020-04-19 NOTE — DISCHARGE SUMMARY
[facility] HOSPITALIST  DISCHARGE SUMMARY     Mireille Wilkins Patient Status:  Inpatient    1/15/1940 MRN [id number]   [facility] 5NW-A Attending No att. providers found   [facility] Day # 5 PCP Dat Yoon MD     Date of Admission: 2020  Date of pt hypoxic due to COVID19 PNA. Pt initially improved w/ better oxygenation however during course pt declined quickly . Went from room air to high flow NC 15L over night. Improved briefly but then again deoxygenating. Started to again spike fevers.  D/w dtr

## 2020-04-19 NOTE — PLAN OF CARE
Pt resting in bed. Up'ed MS gtt to 4mg/hr. Pt respirations decreased to comfortable level at this point to about 10. Nonverbal per baseline, pt gripped hand of RN and moving shoulders at times. Rectal tylenol given for temp 102. Kemp in place for comfort.

## 2020-04-19 NOTE — HOSPICE RN NOTE
Unresponsive to stimuli. Agitated earlier and given Ativan 1mg IVP with relief. Pain ad 0. Has Morphine drip at 4mg/ hour. Respirations periods of apnea rate 8/min. Mottled toes. Sats 60% )2 5L/NC.  Called daughter and updated on patient status and interven

## 2020-04-20 NOTE — PLAN OF CARE
Problem: PAIN - ADULT  Goal: Verbalizes/displays adequate comfort level or patient's stated pain goal  Description  INTERVENTIONS:  - Encourage pt to monitor pain and request assistance  - Assess pain using appropriate pain scale  - Administer analgesics update. 1830 Pt. Cont. To maintain oxygen saturation on 5L/NC. Weaned to 4L. Will cont. To monitor. Temp 99.9 RR 12 this PM. Pt. Cont. To appear comfortable. No signs of pain. No respiratory distress.

## 2020-04-20 NOTE — CM/SW NOTE
Sw called and left message for Vidhya Schwartz , daughter to see if she had any questions or needs,. Left call back number.

## 2020-04-20 NOTE — HOSPICE RN NOTE
GIP day 3: Pt nonresponsive to verbal & tactile stimuli, O2 6L/NC in place, O2 Sa 93%. In past 24 hrs, pt has had: Ativan IVP x2, scopolamine patch in place, Morphine gtt cont IV 4mg/hr, dose incr yesterday, Morphine IVP PRN x2.  Pt resp 10/min, nonlabored,

## 2020-04-20 NOTE — PROGRESS NOTES
[facility]  Progress Note    Shepherd Venkat Patient Status:  Inpatient    1/15/1940 MRN [id number]   [facility] Attending Andre Qiu MD   Hosp Day # 2 PCP Keri Olguin MD     CC: Fever, chills, shortness of breath, hypoxia    S Dictated by: Ernst Velez MD on 4/12/2020 at 11:56 PM         Meds:   Atropine Sulfate 1 % ophthalmic solution 2 drop, 2 drop, Oral, Q2H PRN  acetaminophen (TYLENOL) 650 MG rectal suppository 650 mg, 650 mg, Rectal, Q6H PRN  morphINE 1 mg/mL infusion, 1 mg injury due to dehydration  1. Home hydrochlorothiazide on hold  6. Hypernatremia  1. Dc IVF  7. Thrombocytopenia  8.  Elevated liver function tests    Quality:  · DVT Prophylaxis: hospice  · CODE status: DNR  · Kemp: no  · Central line: no    Will the mery

## 2020-04-20 NOTE — PLAN OF CARE
Assumed pt care at [address] [place] [address]. Drowsy, unable to assess orientation level, nonverbal, unable to follow commands. Pt is on baby monitor. Only voluntary movement made was when the mouth swab was placed in pt's mouth, pt closed their mouth.  6LHFNC,  satting in m OT/PT consult to assist with strengthening/mobility  - Encourage toileting schedule  Outcome: Progressing     Problem: Altered Communication/Language Barrier  Goal: Patient/Family is able to understand and participate in their care  Description  Interventi

## 2020-04-20 NOTE — H&P
[facility] HOSPITALIST  History and Physical     [facility]:  [facility]    1/15/1940 MRN [id number]   [facility] 5NW-A Attending Biju Regalado MD   Hosp Day # 1 PCP Katia Rowe MD     Chief Complaint: sob    History of Pres Performed by Enrique Orr DO at [facility] ENDOSCOPY   • LAPAROSCOPIC CHOLECYSTECTOMY  03/01/2018   • LAPAROSCOPIC CHOLECYSTECTOMY N/A 3/1/2018    Performed by Tess Barrera MD at [facility] MAIN OR   • SURG EXPOS,PROSTATE,RADIOACTIV INSRT         Social History: noted in the HPI. Physical Exam:    /65 (BP Location: Left arm)   Pulse 93   Temp 99.9 °F (37.7 °C) (Axillary)   Resp 16   SpO2 (!) 60%   General: No acute distress on morphine gtt  Respiratory: Clear to auscultation bilaterally. No wheezes.  No rh actemra. 5. Pt declined and now hospice. Spoke w/ dtr today  6. Admitted in inpt hospice, on morphine gtt  3. Dementia  4. Hypertension  1. Home medications hydrochlorothiazide on hold.   5. Acute kidney injury due to dehydration  1.  Home hydrochlorothiaz

## 2020-04-21 NOTE — PROGRESS NOTES
[facility]  Progress Note    Roberto Light Patient Status:  Inpatient    1/15/1940 MRN [id number]   [facility] 5NW-A Attending Marilin Almodovar MD   Hosp Day # 3 PCP Mary Meyers MD     CC: Fever, chills, shortness of breath, hypoxia    S process given the history.            Dictated by: Meena Andrews MD on 4/12/2020 at 11:56 PM         Meds:   Atropine Sulfate 1 % ophthalmic solution 2 drop, 2 drop, Oral, Q2H PRN  acetaminophen (TYLENOL) 650 MG rectal suppository 650 mg, 650 mg, Rectal, Q6H P hydrochlorothiazide on hold  6. Hypernatremia  1. Dc IVF  7. Thrombocytopenia  8.  Elevated liver function tests    Quality:  · DVT Prophylaxis: hospice  · CODE status: DNR  · Kemp: no  · Central line: no    Will the patient be referred to [facility] on discharge

## 2020-04-21 NOTE — PLAN OF CARE
Pt appears comfortable. MS drip as ordered. O2 at 4L,  O2 sats  at 92%    repositioned, skin and mouth care given. Kemp cath intact and patent. Will continue to monitor and maintain comfort.

## 2020-04-21 NOTE — PLAN OF CARE
V/S stable,O2 saturation at a good reading,see flow sheet. Remains on MORPHINE DRIP,Pt.calm and resting,. HS care rendered,repositioned as needed.

## 2020-04-21 NOTE — HOSPICE RN NOTE
Evening check in:  Pt symptoms of tachypnea/dyspnea and agitation managed at this time, Morphine IV gtt remains at 4mg/hr, resp 12/min, nonlabored.  home info in nurse sticky notes as well as [facility] for any needs.  Donna Leija states

## 2020-04-21 NOTE — HOSPICE RN NOTE
GIP d.4. Pt remains unresponsive, no signs of agitation. No s/s pain. Pt on 4l NC with RR 16, not labored. MS drip at 4mg/h. Will continue to monitor. POC reviewed with JAQUELIN Nieves.

## 2020-04-22 NOTE — PROGRESS NOTES
[facility] PSYCHIATRIC [place] HOSPITALIST  Progress Note     Amy Robledo Patient Status:  Inpatient    1/15/1940 MRN [id number]   [facility] 5NW-A [facility] P[facility] Keri Olguin [facility]     Chief Complaint: SOB    S: Patient seen input(s): TROP, CK in the last 168 hours. Imaging: Imaging data reviewed in Epic. Medications:   • scopolamine  1 patch Transdermal Q72H       ASSESSMENT / PLAN:   1.  Fever, lethargy and fatigue with mild shortness of breath and hypoxia and biba

## 2020-04-22 NOTE — HOSPICE RN NOTE
GIP DAY 5    GIP Hospice care for agitation, pain     [facility] with pt giving bath. Pt cool to touch all extremities. Dusky nail beds. Mottling at knees and under toes. Dark gunnar, concentrated urine output   Unresponsive.  calm, quiet, Breathing at 8

## 2020-04-22 NOTE — CM/SW NOTE
SW checked in on status of pt. Pt appears to be imminent. Called and spoke with Yaz Todd to provided update and education on eol; signs and symptoms.

## 2020-04-22 NOTE — PLAN OF CARE
02 sat 92-95% on 02 4L nasal cannula. Appears comfortable. Breathing shallow, 8 respirations per minute, with periods of apnea. lung sounds diminished. Am care provided, including oral care, repositioned.    Moves right foot/leg when foot is touched, oth

## 2020-04-22 NOTE — PLAN OF CARE
Patient not alert. Hospice care. Vital signs stable. Morphine gtt. Kept clean and dry. Resting comfortably in bed.

## 2020-04-23 PROBLEM — Z51.5 HOSPICE CARE: Status: ACTIVE | Noted: 2020-01-01

## 2020-04-23 NOTE — HOSPICE RN NOTE
GIP day 6- patient lethargic, RR 7/minute, o2 decreased to 2lpm. Patient tolerated well. Morphine infusing at 1mg/hr. Urine output concentrated/dk yellow.  Daughter contacted and updated, she spoke to patient over the phone and reassured him it was ok for h

## 2020-04-23 NOTE — CM/SW NOTE
SW met with pt and facilitated phone call with daughter and pt to allow for daughter to give pt permission to pass.  Answered eol questions, provided education

## 2020-04-23 NOTE — PROGRESS NOTES
[facility] PSYCHIATRIC [place] HOSPITALIST  Progress Note     Valdo Christine Patient Status:  Inpatient    1/15/1940 MRN [id number]   [facility] 5NW-A [facility] Enrique Hardy MD     Chief Complaint: SOB    S: Patient seen patch Transdermal Q72H       ASSESSMENT / PLAN:   1. Fever, lethargy and fatigue with mild shortness of breath and hypoxia and bibasilar interstitial pneumonia on presentation due to COVID-19  2. Acute respiratory failure w/ hypoxia  1.  Patient recently tr

## 2020-04-23 NOTE — PLAN OF CARE
Patient on Hospice. Morphine gtt per orders. Vital signs stable. Kemp place. Resting comfortably in bed.

## 2020-04-24 NOTE — PLAN OF CARE
Pt nonverbal, responds to mouth swabbing. O2 sats in low 90s. Pt is hospice. Morphine gtt infusing @ 1mg/hr. Respirations between 10-12 per min. Kemp in place. Plan of care discussed with pt. Will continue to monitor.       Problem: Safety Risk - Non-Viole limitations  - Instruct pt to call for assistance with activity based on assessment  - Modify environment to reduce risk of injury  - Provide assistive devices as appropriate  - Consider OT/PT consult to assist with strengthening/mobility  - Encourage toil

## 2020-04-24 NOTE — PLAN OF CARE
Assumed care of patient at 0700. Morphine drip at 1mg/ml maintained. RR 10/ min. Moaned when performing mouth swabbing.    o2 on 2 liters saturating 87-88%  Hospice- DNR.     1300: morphine drip titrated up to 2 mg due to dyspnea with use of accessory

## 2020-04-24 NOTE — PLAN OF CARE
Patient nonverbal, responding only minimally to mouth swabs and touch. Kemp catheter in place and draining. Isolation precautions maintained. Breathing non-labored, and shallow. Morphine bag changed this AM, infusing as ordered (1mg/hr).  Bed alarm in us Educate pt/family on patient safety including physical limitations  - Instruct pt to call for assistance with activity based on assessment  - Modify environment to reduce risk of injury  - Provide assistive devices as appropriate  - Consider OT/PT consult

## 2020-04-24 NOTE — PROGRESS NOTES
[facility] HOSPITALIST  Progress Note     Tiffanie Nemo Patient Status:  Inpatient    1/15/1940 MRN [id number]   [facility] 5NW-A Attending [address]  [place] Day # 6 PCP Gurvinder Browne MD     Chief Complaint: SOB    S: Patient seen shortness of breath and hypoxia and bibasilar interstitial pneumonia on presentation due to COVID-19  2. Acute respiratory failure w/ hypoxia  1.  Patient recently treated with Plaquenil and azithromycin for positive COVID-19, Plaquenil discontinued as disc

## 2020-04-24 NOTE — HOSPICE RN NOTE
GIP day 7: Pt min responsive to tactile stimuli, O2 2L/NC in place, O2Sa decr today, 86% on 2L, was 90% ytesterday, scopolamine patch in place, Morphine IV gtt 1mg/hr in place, pt became restless when moved during assessment. Amarilis HAMMER will give PRN Ativan.

## 2020-04-25 NOTE — PROGRESS NOTES
[facility] PSYCHIATRIC [place] HOSPITALIST  Progress Note     Morris Bianchi Patient Status:  Inpatient    1/15/1940 MRN [id number]   [facility] 5NW-A [facility] PCP Katia Rowe MD     Chief Complaint: SOB    S: Patient seen on presentation due to COVID-19  2. Acute respiratory failure w/ hypoxia  1. Patient recently treated with Plaquenil and azithromycin for positive COVID-19, Plaquenil discontinued as discussed with patient's daughter over phone. 2. Pulmonary on consult.

## 2020-04-25 NOTE — HOSPICE RN NOTE
GIP Day 8  Patient resting in bed. Minimally responsive to tactile stimuli- tense with oral care. Continues to receive a morphine drip at 2mg/hour. Appears comfortable. Respirations 8/minute. Temp 98. 2. pulse- 108. o2 @ 2lpm/nc/continuous - per assigned nu

## 2020-04-25 NOTE — PLAN OF CARE
Problem: PAIN - ADULT  Goal: Verbalizes/displays adequate comfort level or patient's stated pain goal  Description  INTERVENTIONS:  - Encourage pt to monitor pain and request assistance  - Assess pain using appropriate pain scale  - Administer analgesics SOB or any respiratory difficulty  - Respiratory Therapy support as indicated  - Manage/alleviate anxiety  - Monitor for signs/symptoms of CO2 retention  Outcome: Progressing     Problem: Altered Communication/Language Barrier  Goal: Patient/Family is able

## 2020-04-25 NOTE — PROGRESS NOTES
Pt. On mod. high back , lethargic , arousable to tactile stimuli , just moans at times . O2 at 2 li/min via nc , pt. A mouth breather , O2 sat at 80's% , RR 6/min. comfort measures rendered.  Morphine gtt infusing at  2 mg/hr

## 2020-04-26 NOTE — HOSPICE RN NOTE
GIP Day9: patient remains unresponsive. Receiving Morphine drip at 2mg/hour. Appears comfortable. O2 @ 2lpm- mouth breathing- respiration pattern- 3 respirations- 25 seconds of apnea and repeats. Received prn lorazepam from hospital nurse for dyspnea.  Noe

## 2020-04-26 NOTE — PROGRESS NOTES
[facility] PSYCHIATRIC [place] HOSPITALIST  Progress Note     Hazel Vazquez Patient Status:  Inpatient    1/15/1940 MRN [id number]   [facility] 5NW-A [facility] P[facility] Mary Gregorio MD     Chief Complaint: SOB    S: Patient seen hypoxia and bibasilar interstitial pneumonia on presentation due to COVID-19  2. Acute respiratory failure w/ hypoxia  1.  Patient recently treated with Plaquenil and azithromycin for positive COVID-19, Plaquenil discontinued as discussed with patient's breanne

## 2020-04-26 NOTE — PROGRESS NOTES
RESTING IN BED . RESPONDS TO PAINFUL STIMULI ONLY LIKE WHEN DOING ORAL CARE. Prabhua Netter RESPIRATIONS 8-10/MIN. MOUTH BREATHER. .O2 AT 2L NC . SAT 80-90 %. MCKEON CATHETER INTACT. Barnetta Netter APPEARS COMFORTABLE. REPOSITIONED TO SIDES. COMFORT CARE MAINTAINED.

## 2020-04-26 NOTE — PLAN OF CARE
1100: patient repositioned. Kemp emptied. Oral care provided. Not responsive to verbal or tactile or painful stimuli; including oral car. 1450: Patient's O2 removed per family request. Arletta Duty obtained. Patient repositioned and oral care provided.

## 2020-04-26 NOTE — HOSPICE RN NOTE
Evening Update:  Patient remains unresponsive. O2 @ 2lpm/nc. Morphine drip @ 2mg/hour. Patient received PRN Lorazepam earlier today for dyspnea-which was effective. O2 was dc'd. Symptoms have been effectively managed.

## 2020-04-26 NOTE — PLAN OF CARE
Pt on hospice day 8. Lethargic and sedated. Pt resting peacefully. Repositioned on pillows. Oral care provided. Pt only response to stimuli during oral care. Mouth breathing. On 2L via nasal cannula 85-90%. RR 12.  Morphine drip at 2mg/hr continued for pain

## 2020-04-27 NOTE — CM/SW NOTE
SW received information about pt passing. Called daughter Debbie Vargas to inform . Daughter coping appropriately. Daughter asked if she would want his belongs which were socks, she stated no. No other needs at this time.

## 2020-04-27 NOTE — PROGRESS NOTES
[facility] [place] HOSPITALIST  Progress Note     Sarah Murali Patient Status:  Inpatient    1/15/1940 MRN [id number]   [facility]NW[facility]A [facility] Teri Crow MD     Chief Complaint: SOB    S: Patient seen and bibasilar interstitial pneumonia on presentation due to COVID-19  2. Acute respiratory failure w/ hypoxia  1.  Patient recently treated with Plaquenil and azithromycin for positive COVID-19, Plaquenil discontinued as discussed with patient's daughter ov

## 2020-04-27 NOTE — PLAN OF CARE
Pt non responsive, in bed. Seems to have increase WOB. Ativan given per PRN orders. Respirations about 18. RA. Kemp in place. Bathed and oral care administered. Hospice RN here, will reorder PRN MS IV. Will increase MS IV continuous rate.  Will continue to

## 2020-04-27 NOTE — CM/SW NOTE
SW met with pt in room . Appeared labored , increasing morphine from 3-4mg. Called daughter Ethan Le to inform of update.

## 2020-04-27 NOTE — PLAN OF CARE
RN noticed pt had more apneic episodes. RN watching baby monitor closely, noticed pt did not appear to be breathing. RN entered room, no pulse or respirations. Time of death 1430. Jonathan from residential hospice notified and will call daughter to notify.

## 2020-04-27 NOTE — PROGRESS NOTES
Patient is non responsive. 55-60% on RA. Apneic at times. Kemp draining dark, concentrated urine. Morphine drip increased to 3mg/hr, patient's breathing is appearing more labored. Tachycardic. Ativan given per MAR. repositioned for comfort.  Will continue

## 2020-04-27 NOTE — HOSPICE RN NOTE
GIP d. 10.  Unresponsive; grey appearance. RR 18, clear, labored at 3mg/h MS drip. Reviewed POC with JAQUELIN Fernandez and we agree to providing 1mg IVP MS and increase drip to 4mg/h.

## 2020-04-29 NOTE — DISCHARGE SUMMARY
[facility] HOSPITALIST  DISCHARGE SUMMARY     Korin Ash Patient Status:  Inpatient    1/15/1940 MRN [id number]   [facility] 5NW-A Attending No att. providers found   [facility] Day # 9 PCP Beck Castañeda MD     Date of Admission: 2020  Date of hospitalization:   • None    Incidental or significant findings and recommendations (brief descriptions):  • None    Lab/Test results pending at Discharge:   · None    Consultants:  • None    Discharge Medication List:     Discharge Medications      ASK yo auscultation bilaterally. No wheezes. No rhonchi. Cardiovascular: S1, S2. Regular rate and rhythm. No murmurs, rubs or gallops. Abdomen: Soft, nontender, nondistended. Positive bowel sounds. No rebound or guarding.   Neurologic: No focal neurological de

## 2021-08-02 NOTE — PROGRESS NOTES
[facility] HOSPITALIST  Progress Note     Leann Karthikkylah Patient Status:  Inpatient    1/15/1940 MRN [id number]   [facility] 4NW-A Attending Cristino Talbot MD   Hosp Day # 1 PCP [facility]     Chief Complaint: Nausea, vomiting    S: Patient nonverbal • Heparin Sodium (Porcine)  5,000 Units Subcutaneous 2 times per day   • Donepezil HCl  10 mg Oral Nightly   • piperacillin-tazobactam  3.375 g Intravenous Q8H   • ondansetron HCl  4 mg Intravenous Once       ASSESSMENT / PLAN:     1.  Gallstone pancreati no

## 2022-02-08 NOTE — CM/SW NOTE
IKE sent updated information to [facility]
MSW spoke with RN. The patient has been cleared for dc to [facility]. MSW spoke with Loulou Martínez at [facility] to confirm acceptance back. The patient will be discharged via [address] today at 4:30pm.  PCS form completed.     Abram Castro
Patient is a 76 y/o man admitted for acute pancreatitis. Pt has dementia and is confused. Spoke with pt's dtr/HCPOA, Victoria Ugalde who confirmed that pt is a long-term care resident of [facility] in [facility] since 9/2016.   Pt is currently private pay at the NH, sp
no iv

## 2023-06-20 NOTE — LETTER
[facility]  Jayant Khalifnorm 60 5[address] [facility], [address] [place] [address]    Consent for Operation    Date: __________________    Time: _______________    1.  I authorize the performance upon Eliza Boyd the following operation:    Procedure(s):  laparoscopic cholecystect procedure has been videotaped, the surgeon will obtain the original videotape. The hospital will not be responsible for storage or maintenance of this tape.     6. For the purpose of advancing medical education, I consent to the admittance of observers to t STATEMENTS REQUIRING INSERTION OR COMPLETION WERE FILLED IN.     Signature of Patient:   ___________________________    When the patient is a minor or mentally incompetent to give consent:  Signature of person authorized to consent for patient: ____________ drugs/illegal medications). Failure to inform my anesthesiologist about these medicines may increase my risk of anesthetic complications. · If I am allergic to anything or have had a reaction to anesthesia before.     3. I understand how the anesthesia med I have discussed the procedure and information above with the patient (or patient’s representative) and answered their questions. The patient or their representative has agreed to have anesthesia services.     _______________________________________________ Oxybutynin Counseling:  I discussed with the patient the risks of oxybutynin including but not limited to skin rash, drowsiness, dry mouth, difficulty urinating, and blurred vision.

## 2024-10-30 NOTE — PROGRESS NOTES
[facility]  Progress Note    Caroline Driver Patient Status:  Inpatient    1/15/1940 MRN [id number]   [facility] 5NW-A Attending Janessa Mayen MD   Hosp Day # 0 PCP Marco Antonio Devine MD     CC: Fever, chills, shortness of breath, hypoxia    S BUN 50 04/12/2020     04/12/2020    K 4.0 04/12/2020     04/12/2020    CO2 25.0 04/12/2020     04/12/2020    CA 7.8 04/12/2020    ALB 2.7 04/12/2020    ALKPHO 59 04/12/2020    BILT 0.4 04/12/2020    TP 6.9 04/12/2020    AST 82 04/12/2020 positive COVID-19. Discussed with pulmonary. Patient currently on room air. Continue to monitor closely  2. Dementia  1. Continue home medication Aricept  3. Hypertension  1. Home medications hydrochlorothiazide on hold.   4. Acute kidney injury due to d No

## (undated) DEVICE — SUTURE VICRYL 5-0 P-3

## (undated) DEVICE — ABSORBABLE HEMOSTAT (OXIDIZED REGENERATED CELLULOSE, U.S.P.): Brand: SURGICEL

## (undated) DEVICE — MEDI-VAC SUCTION HANDLE REGULAR CAPACITY: Brand: CARDINAL HEALTH

## (undated) DEVICE — BANDAID COVERLET 1X3

## (undated) DEVICE — TROCAR: Brand: KII® SLEEVE

## (undated) DEVICE — CHLORAPREP 26ML APPLICATOR

## (undated) DEVICE — PDS II VLT 0 107CM AG ST3: Brand: ENDOLOOP

## (undated) DEVICE — DRAIN CHANNEL 19FR BLAKE

## (undated) DEVICE — TIGERTAIL 5F FLXTIP 70CM

## (undated) DEVICE — GLOVE BIOGEL M SURG SZ 6-1/2

## (undated) DEVICE — 1200CC GUARDIAN II: Brand: GUARDIAN

## (undated) DEVICE — TROCAR: Brand: KII SHIELDED BLADED ACCESS SYSTEM

## (undated) DEVICE — NITINOL WIRE STR 035

## (undated) DEVICE — SPHINCTEROTOME: Brand: HYDRATOME RX 44

## (undated) DEVICE — LIGAMAX 5 MM ENDOSCOPIC MULTIPLE CLIP APPLIER: Brand: LIGAMAX

## (undated) DEVICE — SOL  .9 1000ML BTL

## (undated) DEVICE — LOCKING DEVICE RX & BIOPSY CAP

## (undated) DEVICE — KENDALL SCD EXPRESS SLEEVES, KNEE LENGTH, MEDIUM: Brand: KENDALL SCD

## (undated) DEVICE — SYRINGE 30ML LL TIP

## (undated) DEVICE — LAP CHOLE/APPY CDS-LF: Brand: MEDLINE INDUSTRIES, INC.

## (undated) DEVICE — SYRINGE 60ML SLIP TIP

## (undated) DEVICE — REM POLYHESIVE ADULT PATIENT RETURN ELECTRODE: Brand: VALLEYLAB

## (undated) DEVICE — ANCHOR TISSUE RETRIEVAL SYSTEM, BAG SIZE 175 ML, PORT SIZE 10 MM: Brand: ANCHOR TISSUE RETRIEVAL SYSTEM

## (undated) DEVICE — RETRIEVAL BALLOON CATHETER: Brand: EXTRACTOR™ PRO RX

## (undated) DEVICE — SUTURE VICRYL 0

## (undated) DEVICE — Device

## (undated) DEVICE — DRAIN RELIAVAC 100CC

## (undated) DEVICE — MEDI-VAC NON-CONDUCTIVE SUCTION TUBING: Brand: CARDINAL HEALTH

## (undated) DEVICE — 3M™ RED DOT™ MONITORING ELECTRODE WITH FOAM TAPE AND STICKY GEL, 50/BAG, 20/CASE, 72/PLT 2570: Brand: RED DOT™

## (undated) DEVICE — SUTURE VICRYL 0 UR-6

## (undated) DEVICE — ENDOSCOPY PACK UPPER: Brand: MEDLINE INDUSTRIES, INC.

## (undated) DEVICE — FILTERLINE NASAL ADULT O2/CO2

## (undated) NOTE — IP AVS SNAPSHOT
[address]            (For Outpatient Use Only) Initial Admit Date: 2/24/2018   Inpt/Obs Admit Date: Inpt: 2/25/18 / Obs: N/A   Discharge Date:    Ben Thompson:  [de-identified]   MRN: [de-identified]   CSN: [id number]        [id number] Hospital Account Financial Class: Medicare    March 4, 2018

## (undated) NOTE — LETTER
[facility]  Jayant Gutierrez 60 1793 [facility], [address] [place] [address]    Consent for Operation    Date: __________________    Time: _______________    1.  I authorize the performance upon Oneita Harms the following operation:    Endoscopic Ultrasound, Endoscopic Retrog revealed by the pictures or by descriptive texts accompanying them. If the procedure has been videotaped, the surgeon will obtain the original videotape. The hospital will not be responsible for storage or maintenance of this tape.     6. For the purpose of THAT MY DOCTOR PROVIDED ME WITH THE ABOVE EXPLANATIONS, THAT ALL BLANKS OR STATEMENTS REQUIRING INSERTION OR COMPLETION WERE FILLED IN.     Signature of Patient:   ___________________________    When the patient is a minor or mentally incompetent to give co supplements, and pills I can buy without a prescription (including street drugs/illegal medications). Failure to inform my anesthesiologist about these medicines may increase my risk of anesthetic complications.   · If I am allergic to anything or have had Anesthesiologist Signature     Date   Time  I have discussed the procedure and information above with the patient (or patient’s representative) and answered their questions. The patient or their representative has agreed to have anesthesia services.     ___

## (undated) NOTE — LETTER
18    Patient: Korin Ash  : 1/15/1940 Visit date: 3/13/2018    Dear  Dr. Nomi Bustamante,    Thank you for referring Korin Ash to my practice. Please find my assessment and plan below.        Assessment   Calculus of gallbladder and bile duct with acute ch

## (undated) NOTE — LETTER
[facility]  Jayant Gutierrez 61 8[address] [facility], [address] [place] [address]    Consent for Operation    Date: __________________    Time: _______________    1.  I authorize the performance upon Amy Robledo the following operation:    Endoscopic Ultrasound, Endoscopic Retrog procedure has been videotaped, the surgeon will obtain the original videotape. The hospital will not be responsible for storage or maintenance of this tape.     6. For the purpose of advancing medical education, I consent to the admittance of observers to t STATEMENTS REQUIRING INSERTION OR COMPLETION WERE FILLED IN.     Signature of Patient:   ___________________________    When the patient is a minor or mentally incompetent to give consent:  Signature of person authorized to consent for patient: ____________ drugs/illegal medications). Failure to inform my anesthesiologist about these medicines may increase my risk of anesthetic complications. · If I am allergic to anything or have had a reaction to anesthesia before.     3. I understand how the anesthesia med I have discussed the procedure and information above with the patient (or patient’s representative) and answered their questions. The patient or their representative has agreed to have anesthesia services.     _______________________________________________

## (undated) NOTE — LETTER
[facility]  Jayant Gutierrez 60 0780 [facility], [address] [place] [address]    Consent for Anesthesia   1.   IGutierrez agree to be cared for by an anesthesiologist, who is specially trained to monitor me and give me medicine to put me to sleep or keep me comfortable d vision, nerves, or muscles and in extremely rare instances death. 5. My doctor has explained to me other choices available to me for my care (alternatives).   6. Pregnant Patients (“epidural”):  I understand that the risks of having an epidural (medicine g

## (undated) NOTE — LETTER
[facility]  Porterzana Cuadranorm 61 2406 [facility], [address] [place] [address]    Consent for Operation    Date: __________________    Time: _______________    1.  I authorize the performance upon Placido Bianchi the following operation:    Procedure(s):  laparoscopic cholecystect procedure has been videotaped, the surgeon will obtain the original videotape. The hospital will not be responsible for storage or maintenance of this tape.     6. For the purpose of advancing medical education, I consent to the admittance of observers to t STATEMENTS REQUIRING INSERTION OR COMPLETION WERE FILLED IN.     Signature of Patient:   ___________________________    When the patient is a minor or mentally incompetent to give consent:  Signature of person authorized to consent for patient: ____________ drugs/illegal medications). Failure to inform my anesthesiologist about these medicines may increase my risk of anesthetic complications. · If I am allergic to anything or have had a reaction to anesthesia before.     3. I understand how the anesthesia med I have discussed the procedure and information above with the patient (or patient’s representative) and answered their questions. The patient or their representative has agreed to have anesthesia services.     _______________________________________________

## (undated) NOTE — IP AVS SNAPSHOT
Patient Demographics     Address  [address]  [place] [address] Phone  [phone] [facility])      Emergency Contact(s)     Name Relation Home Work Mobile    Zhanna Soto Daughter   [phone]      Allergies as of 3/4/2018  Reviewed on: 3/1/2018   N steri-strips or butterfly tapes that are white and adherent to the skin. The steri-strips will eventually peel up at the ends and at this point they may be removed. This is usually seven to ten days after surgery.   The patient may shower the day after huertas pushing or pulling. Nothing called work or exercise until the follow up visit. No ‘stair-master’, power walking, jogging or workouts until the follow up visit. Patients should seek further activity limits at the time of their appointment.     APPOINTMENT Take 1 capsule (500 mg total) by mouth 3 (three) times daily. Stop taking on:  3/10/2018   Matthew Wilkinson MD         Donepezil HCl 10 MG Tabs  Commonly known as:  ARICEPT      Take 10 mg by mouth nightly.           EUCERIN Crea      Apply topically as need Ordering provider:  Ines Rodas MD  03/03/18 3691 Resulting lab:  [facility]    Specimen Information    Type Source Collected On   Blood — 03/04/18 0627          Components    Component Value Reference Range Flag Lab   Potassium 3.6 3.6 - 5.1 mmol/L — Blood Culture Result --      Escherichia coli (A)     Blood Culture Smear Gram Negative Rods      Escherichia coli by PCR    Susceptibility      Escherichia coli     Not Specified    Ampicillin <=2  Sensitive    Cefazolin <=4  Sensitive    Ciprofloxacin smokeless tobacco. He reports that he does not drink alcohol or use drugs.     Family History:   Family History   Problem Relation Age of Onset   • Cancer Father      liver   • Heart Disorder Neg        Allergies: No Known Allergies    Medications:    No cu ALKPHO  312*   AST  168*   ALT  293*   BILT  7.3*   TP  7.3       Estimated Creatinine Clearance: 23.3 mL/min (based on SCr of 1.98 mg/dL (H)). No results for input(s): PTP, INR in the last 72 hours.     No results for input(s): TROP, CK in the last 72 h Hosp Day # 0 PCP JOHN [facility]     Chief Complaint:[GS.1] Nausea, vomiting, fatigue[GS.2]    History of Present Illness: Susanne Tamez is a 77year old male with[GS.1] h/o dementia, HTN, prostate ca  Who presents from his NH with increasing fatigue, some nausea an Cardiovascular: S1, S2. Regular rate and rhythm. No murmurs, rubs or gallops. Equal pulses. Chest and Back: No tenderness or deformity. Abdomen: Soft, nontender, nondistended. Positive bowel sounds. No rebound, guarding or organomegaly.   Neurologic: No GS.2 Radha Osorio MD on 2/25/2018  2:35 AM  GS. 3 - Manuela Block MD on 2/24/2018 11:23 PM                        Consults - MD Consult Notes      Consults signed by Ofelia Godinez MD at 2/27/2018 10:13 PM     Author:  Ofelia Godinez MD Service:  Eugenio Celis Serology from yesterday reveals normalizing liver function tests. [BK.2]  History:  Past Medical History:   Diagnosis Date   • Cancer ([facility] [address]    • Dementia    • Other and unspecified hyperlipidemia    • Unspecified essential hypertension      Past Surgical Hi Abdomen:[BK.1] Soft, nondistended, no grimacing or reaction to palpation in the epigastrium and right upper quadrant. Well-healed infraumbilical incision status post prostatectomy for prostate cancer n[BK.2]o peritoneal signs.      Extremities:  No lower e Serology reveals liver enzymes normalizing. No leukocytosis. The patient did have positive blood culture for gram-negative sebastian  Past medical history includes hypertension, acute renal insufficiency, thrombocytopenia, dementia, prostate cancer.     This ca Author:  Russ Machado MD Service:  Internal Medicine Author Type:  Physician    Filed:  3/4/2018  2:40 PM Date of Service:  3/4/2018 12:58 PM Status:  Addendum    :  Russ Machado MD (Physician)    Related Notes:  Original Note by Edwin Reed aspiration pneumonia postop.   Patient continued to recover     [facility] Dr. Maco Gutierrez and Ethan Rocha, GI; Dr. Hector Alfonso surgery    Procedures: EUS, ERCP with sphincterotomy and stone removal, laparoscopic cholecystectomy with cholangiogram Filed:  3/4/2018  1:09 PM Date of Service:  3/4/2018 12:58 PM Status:  Signed    :  Idania Schmitz MD (Physician)    Related Notes:  Addendum by Idania Schmitz MD (Physician) filed at 3/4/2018  2:40 PM       [facility]  Discharge Summar Consultations: Dr. Alex Hernández [facility]; Dr. Donna Kendrick and Willian Floyd, GI; Dr. Lori Torres surgery    Procedures: EUS, ERCP with sphincterotomy and stone removal, laparoscopic cholecystectomy with cholangiogram    Complications: None    Disposition: SNF    Discharge Condition: No notes of this type exist for this encounter. Occupational Therapy Notes (last 72 hours) (Notes from 3/1/2018  4:21 PM through 3/4/2018  4:21 PM)     No notes of this type exist for this encounter.       Video Swallow Study Notes     No notes of thi

## (undated) NOTE — LETTER
[facility]  Jayant Khalifnorm 61 4[address] [facility], [address] [place] [address]    Consent for Operation    Date: __________________    Time: _______________    1.  I authorize the performance upon Amy Robledo the following operation:    Procedure ***Laproscopic Cholecystectomy procedure has been videotaped, the surgeon will obtain the original videotape. The hospital will not be responsible for storage or maintenance of this tape.     6. For the purpose of advancing medical education, I consent to the admittance of observers to t STATEMENTS REQUIRING INSERTION OR COMPLETION WERE FILLED IN.     Signature of Patient:   ___________________________    When the patient is a minor or mentally incompetent to give consent:  Signature of person authorized to consent for patient: ____________ drugs/illegal medications). Failure to inform my anesthesiologist about these medicines may increase my risk of anesthetic complications. · If I am allergic to anything or have had a reaction to anesthesia before.     3. I understand how the anesthesia med I have discussed the procedure and information above with the patient (or patient’s representative) and answered their questions. The patient or their representative has agreed to have anesthesia services.     _______________________________________________